# Patient Record
Sex: FEMALE | Race: WHITE | NOT HISPANIC OR LATINO | URBAN - METROPOLITAN AREA
[De-identification: names, ages, dates, MRNs, and addresses within clinical notes are randomized per-mention and may not be internally consistent; named-entity substitution may affect disease eponyms.]

---

## 2020-01-03 ENCOUNTER — EMERGENCY (EMERGENCY)
Facility: HOSPITAL | Age: 55
LOS: 1 days | Discharge: HOME | End: 2020-01-03
Attending: EMERGENCY MEDICINE | Admitting: EMERGENCY MEDICINE
Payer: COMMERCIAL

## 2020-01-03 VITALS
OXYGEN SATURATION: 98 % | TEMPERATURE: 98 F | HEART RATE: 70 BPM | SYSTOLIC BLOOD PRESSURE: 109 MMHG | RESPIRATION RATE: 16 BRPM | DIASTOLIC BLOOD PRESSURE: 73 MMHG

## 2020-01-03 VITALS
HEART RATE: 98 BPM | TEMPERATURE: 98 F | DIASTOLIC BLOOD PRESSURE: 81 MMHG | SYSTOLIC BLOOD PRESSURE: 116 MMHG | HEIGHT: 60 IN | OXYGEN SATURATION: 100 % | RESPIRATION RATE: 16 BRPM | WEIGHT: 121.92 LBS

## 2020-01-03 DIAGNOSIS — N60.02 SOLITARY CYST OF LEFT BREAST: Chronic | ICD-10-CM

## 2020-01-03 LAB
ALBUMIN SERPL ELPH-MCNC: 4 G/DL — SIGNIFICANT CHANGE UP (ref 3.3–5)
ALP SERPL-CCNC: 75 U/L — SIGNIFICANT CHANGE UP (ref 40–120)
ALT FLD-CCNC: 41 U/L — SIGNIFICANT CHANGE UP (ref 12–78)
ANION GAP SERPL CALC-SCNC: 9 MMOL/L — SIGNIFICANT CHANGE UP (ref 5–17)
APPEARANCE UR: ABNORMAL
APTT BLD: 36.7 SEC — SIGNIFICANT CHANGE UP (ref 28.5–37)
AST SERPL-CCNC: 29 U/L — SIGNIFICANT CHANGE UP (ref 15–37)
BACTERIA # UR AUTO: ABNORMAL
BASOPHILS # BLD AUTO: 0.04 K/UL — SIGNIFICANT CHANGE UP (ref 0–0.2)
BASOPHILS NFR BLD AUTO: 0.6 % — SIGNIFICANT CHANGE UP (ref 0–2)
BILIRUB SERPL-MCNC: 1.1 MG/DL — SIGNIFICANT CHANGE UP (ref 0.2–1.2)
BILIRUB UR-MCNC: ABNORMAL
BUN SERPL-MCNC: 11 MG/DL — SIGNIFICANT CHANGE UP (ref 7–23)
CALCIUM SERPL-MCNC: 9.5 MG/DL — SIGNIFICANT CHANGE UP (ref 8.5–10.1)
CHLORIDE SERPL-SCNC: 111 MMOL/L — HIGH (ref 96–108)
CO2 SERPL-SCNC: 24 MMOL/L — SIGNIFICANT CHANGE UP (ref 22–31)
COLOR SPEC: YELLOW — SIGNIFICANT CHANGE UP
CREAT SERPL-MCNC: 0.67 MG/DL — SIGNIFICANT CHANGE UP (ref 0.5–1.3)
DIFF PNL FLD: NEGATIVE — SIGNIFICANT CHANGE UP
EOSINOPHIL # BLD AUTO: 0.05 K/UL — SIGNIFICANT CHANGE UP (ref 0–0.5)
EOSINOPHIL NFR BLD AUTO: 0.7 % — SIGNIFICANT CHANGE UP (ref 0–6)
EPI CELLS # UR: SIGNIFICANT CHANGE UP
GLUCOSE SERPL-MCNC: 93 MG/DL — SIGNIFICANT CHANGE UP (ref 70–99)
GLUCOSE UR QL: NEGATIVE — SIGNIFICANT CHANGE UP
HCT VFR BLD CALC: 34.9 % — SIGNIFICANT CHANGE UP (ref 34.5–45)
HGB BLD-MCNC: 11.9 G/DL — SIGNIFICANT CHANGE UP (ref 11.5–15.5)
IMM GRANULOCYTES NFR BLD AUTO: 0.3 % — SIGNIFICANT CHANGE UP (ref 0–1.5)
INR BLD: 1.04 RATIO — SIGNIFICANT CHANGE UP (ref 0.88–1.16)
KETONES UR-MCNC: NEGATIVE — SIGNIFICANT CHANGE UP
LACTATE SERPL-SCNC: 1.2 MMOL/L — SIGNIFICANT CHANGE UP (ref 0.7–2)
LEUKOCYTE ESTERASE UR-ACNC: ABNORMAL
LYMPHOCYTES # BLD AUTO: 1.42 K/UL — SIGNIFICANT CHANGE UP (ref 1–3.3)
LYMPHOCYTES # BLD AUTO: 19.5 % — SIGNIFICANT CHANGE UP (ref 13–44)
MCHC RBC-ENTMCNC: 29.5 PG — SIGNIFICANT CHANGE UP (ref 27–34)
MCHC RBC-ENTMCNC: 34.1 GM/DL — SIGNIFICANT CHANGE UP (ref 32–36)
MCV RBC AUTO: 86.4 FL — SIGNIFICANT CHANGE UP (ref 80–100)
MONOCYTES # BLD AUTO: 0.57 K/UL — SIGNIFICANT CHANGE UP (ref 0–0.9)
MONOCYTES NFR BLD AUTO: 7.8 % — SIGNIFICANT CHANGE UP (ref 2–14)
NEUTROPHILS # BLD AUTO: 5.17 K/UL — SIGNIFICANT CHANGE UP (ref 1.8–7.4)
NEUTROPHILS NFR BLD AUTO: 71.1 % — SIGNIFICANT CHANGE UP (ref 43–77)
NITRITE UR-MCNC: POSITIVE
NRBC # BLD: 0 /100 WBCS — SIGNIFICANT CHANGE UP (ref 0–0)
PH UR: 7 — SIGNIFICANT CHANGE UP (ref 5–8)
PLATELET # BLD AUTO: 289 K/UL — SIGNIFICANT CHANGE UP (ref 150–400)
POTASSIUM SERPL-MCNC: 3.8 MMOL/L — SIGNIFICANT CHANGE UP (ref 3.5–5.3)
POTASSIUM SERPL-SCNC: 3.8 MMOL/L — SIGNIFICANT CHANGE UP (ref 3.5–5.3)
PROT SERPL-MCNC: 7.6 G/DL — SIGNIFICANT CHANGE UP (ref 6–8.3)
PROT UR-MCNC: NEGATIVE — SIGNIFICANT CHANGE UP
PROTHROM AB SERPL-ACNC: 11.8 SEC — SIGNIFICANT CHANGE UP (ref 10–12.9)
RBC # BLD: 4.04 M/UL — SIGNIFICANT CHANGE UP (ref 3.8–5.2)
RBC # FLD: 12.4 % — SIGNIFICANT CHANGE UP (ref 10.3–14.5)
RBC CASTS # UR COMP ASSIST: NEGATIVE /HPF — SIGNIFICANT CHANGE UP (ref 0–4)
SODIUM SERPL-SCNC: 144 MMOL/L — SIGNIFICANT CHANGE UP (ref 135–145)
SP GR SPEC: 1 — LOW (ref 1.01–1.02)
UROBILINOGEN FLD QL: NEGATIVE — SIGNIFICANT CHANGE UP
WBC # BLD: 7.27 K/UL — SIGNIFICANT CHANGE UP (ref 3.8–10.5)
WBC # FLD AUTO: 7.27 K/UL — SIGNIFICANT CHANGE UP (ref 3.8–10.5)
WBC UR QL: SIGNIFICANT CHANGE UP

## 2020-01-03 PROCEDURE — 93005 ELECTROCARDIOGRAM TRACING: CPT

## 2020-01-03 PROCEDURE — 81001 URINALYSIS AUTO W/SCOPE: CPT

## 2020-01-03 PROCEDURE — 99284 EMERGENCY DEPT VISIT MOD MDM: CPT

## 2020-01-03 PROCEDURE — 83605 ASSAY OF LACTIC ACID: CPT

## 2020-01-03 PROCEDURE — 36415 COLL VENOUS BLD VENIPUNCTURE: CPT

## 2020-01-03 PROCEDURE — 87086 URINE CULTURE/COLONY COUNT: CPT

## 2020-01-03 PROCEDURE — 87040 BLOOD CULTURE FOR BACTERIA: CPT

## 2020-01-03 PROCEDURE — 85610 PROTHROMBIN TIME: CPT

## 2020-01-03 PROCEDURE — 71046 X-RAY EXAM CHEST 2 VIEWS: CPT | Mod: 26

## 2020-01-03 PROCEDURE — 80053 COMPREHEN METABOLIC PANEL: CPT

## 2020-01-03 PROCEDURE — 99283 EMERGENCY DEPT VISIT LOW MDM: CPT | Mod: 25

## 2020-01-03 PROCEDURE — 85730 THROMBOPLASTIN TIME PARTIAL: CPT

## 2020-01-03 PROCEDURE — 85027 COMPLETE CBC AUTOMATED: CPT

## 2020-01-03 PROCEDURE — 71046 X-RAY EXAM CHEST 2 VIEWS: CPT

## 2020-01-03 PROCEDURE — 96360 HYDRATION IV INFUSION INIT: CPT

## 2020-01-03 PROCEDURE — 93010 ELECTROCARDIOGRAM REPORT: CPT

## 2020-01-03 RX ORDER — CEFUROXIME AXETIL 250 MG
1 TABLET ORAL
Qty: 14 | Refills: 0
Start: 2020-01-03 | End: 2020-01-09

## 2020-01-03 RX ORDER — SODIUM CHLORIDE 9 MG/ML
1700 INJECTION INTRAMUSCULAR; INTRAVENOUS; SUBCUTANEOUS ONCE
Refills: 0 | Status: COMPLETED | OUTPATIENT
Start: 2020-01-03 | End: 2020-01-03

## 2020-01-03 RX ORDER — CEFUROXIME AXETIL 250 MG
500 TABLET ORAL ONCE
Refills: 0 | Status: COMPLETED | OUTPATIENT
Start: 2020-01-03 | End: 2020-01-03

## 2020-01-03 RX ADMIN — SODIUM CHLORIDE 1700 MILLILITER(S): 9 INJECTION INTRAMUSCULAR; INTRAVENOUS; SUBCUTANEOUS at 11:30

## 2020-01-03 RX ADMIN — SODIUM CHLORIDE 1700 MILLILITER(S): 9 INJECTION INTRAMUSCULAR; INTRAVENOUS; SUBCUTANEOUS at 10:11

## 2020-01-03 RX ADMIN — Medication 500 MILLIGRAM(S): at 10:30

## 2020-01-03 NOTE — ED PROVIDER NOTE - PATIENT PORTAL LINK FT
You can access the FollowMyHealth Patient Portal offered by [facility] by registering at the following website: [email or web address]. By joining [employer]’s FollowMyHealth portal, you will also be able to view your health information using other applications (apps) compatible with our system.

## 2020-01-03 NOTE — ED ADULT NURSE NOTE - OBJECTIVE STATEMENT
Received pt in bed alert and oriented.  C/O urinary symptoms since yesterday.  Pt went to  and was given ampicillin.  Pt stated she still does not feel well.  Ongoing nursing care and safety maintained.

## 2020-01-03 NOTE — ED PROVIDER NOTE - NSFOLLOWUPINSTRUCTIONS_ED_ALL_ED_FT
Urinary Tract Infection    A urinary tract infection (UTI) is an infection of any part of the urinary tract, which includes the kidneys, ureters, bladder, and urethra. Risk factors include ignoring your need to urinate, wiping back to front if female, being an uncircumcised male, and having diabetes or a weak immune system. Symptoms include frequent urination, pain or burning with urination, foul smelling urine, cloudy urine, pain in the lower abdomen, blood in the urine, and fever. If you were prescribed an antibiotic medicine, take it as told by your health care provider. Do not stop taking the antibiotic even if you start to feel better.    SEEK IMMEDIATE MEDICAL CARE IF YOU HAVE ANY OF THE FOLLOWING SYMPTOMS: severe back or abdominal pain, fever, inability to keep fluids or medicine down, dizziness/lightheadedness, or a change in mental status.    STOP AMOXICILLIN START CEFTIN  STAY WELL HYDRATED   FOLLOW UP WITH DR GAYLE

## 2020-01-03 NOTE — ED ADULT NURSE NOTE - NSIMPLEMENTINTERV_GEN_ALL_ED
Implemented All Universal Safety Interventions:  [place] to call system. Call bell, personal items and telephone within reach. Instruct patient to call for assistance. Room bathroom lighting operational. Non-slip footwear when patient is off stretcher. Physically safe environment: no spills, clutter or unnecessary equipment. Stretcher in lowest position, wheels locked, appropriate side rails in place.

## 2020-01-03 NOTE — ED ADULT NURSE NOTE - CARDIO WDL
Normal rate, regular rhythm, normal S1, S2 heart sounds heard.
0 = understands/communicates without difficulty

## 2020-01-03 NOTE — ED PROVIDER NOTE - ENMT, MLM
Airway patent, Nasal mucosa clear. Mouth with normal mucosa. Throat has no vesicles, no oropharyngeal exudates and uvula is midline. tm's clear

## 2020-01-03 NOTE — ED PROVIDER NOTE - CARE PROVIDER_API CALL
Anthony Santos)  Cardiovascular Disease; Internal Medicine  [address] [place] [address]  Phone: [phone]  Fax: [phone]  Follow Up Time:

## 2020-01-03 NOTE — ED PROVIDER NOTE - OBJECTIVE STATEMENT
pt is a 55 yo f who has hx of hypothyroid, and breast cysts surgery pmd dr forrester was seen in urgent care yest for urine dysuria started on ampicillin and pyridium for uti bu marvel still feels unwell with cough body aches and palpitations.  no fever no chills no n v d no travel no ill contacts  worried for more serous infection she came to er with  for eval.

## 2020-01-04 LAB
CULTURE RESULTS: NO GROWTH — SIGNIFICANT CHANGE UP
SPECIMEN SOURCE: SIGNIFICANT CHANGE UP

## 2020-01-08 LAB
CULTURE RESULTS: SIGNIFICANT CHANGE UP
CULTURE RESULTS: SIGNIFICANT CHANGE UP
SPECIMEN SOURCE: SIGNIFICANT CHANGE UP
SPECIMEN SOURCE: SIGNIFICANT CHANGE UP

## 2020-01-26 ENCOUNTER — EMERGENCY (EMERGENCY)
Facility: HOSPITAL | Age: 55
LOS: 1 days | Discharge: HOME | End: 2020-01-26
Attending: EMERGENCY MEDICINE | Admitting: EMERGENCY MEDICINE
Payer: COMMERCIAL

## 2020-01-26 VITALS
TEMPERATURE: 98 F | HEART RATE: 88 BPM | DIASTOLIC BLOOD PRESSURE: 77 MMHG | OXYGEN SATURATION: 97 % | SYSTOLIC BLOOD PRESSURE: 123 MMHG | RESPIRATION RATE: 18 BRPM

## 2020-01-26 VITALS
WEIGHT: 115.08 LBS | HEIGHT: 60 IN | DIASTOLIC BLOOD PRESSURE: 82 MMHG | TEMPERATURE: 98 F | OXYGEN SATURATION: 96 % | HEART RATE: 92 BPM | RESPIRATION RATE: 18 BRPM | SYSTOLIC BLOOD PRESSURE: 121 MMHG

## 2020-01-26 DIAGNOSIS — N60.02 SOLITARY CYST OF LEFT BREAST: Chronic | ICD-10-CM

## 2020-01-26 PROBLEM — E03.9 HYPOTHYROIDISM, UNSPECIFIED: Chronic | Status: ACTIVE | Noted: 2020-01-03

## 2020-01-26 LAB
ALBUMIN SERPL ELPH-MCNC: 4.1 G/DL — SIGNIFICANT CHANGE UP (ref 3.3–5)
ALP SERPL-CCNC: 73 U/L — SIGNIFICANT CHANGE UP (ref 40–120)
ALT FLD-CCNC: 44 U/L — SIGNIFICANT CHANGE UP (ref 12–78)
ANION GAP SERPL CALC-SCNC: 9 MMOL/L — SIGNIFICANT CHANGE UP (ref 5–17)
AST SERPL-CCNC: 30 U/L — SIGNIFICANT CHANGE UP (ref 15–37)
BASOPHILS # BLD AUTO: 0.05 K/UL — SIGNIFICANT CHANGE UP (ref 0–0.2)
BASOPHILS NFR BLD AUTO: 0.6 % — SIGNIFICANT CHANGE UP (ref 0–2)
BILIRUB SERPL-MCNC: 0.5 MG/DL — SIGNIFICANT CHANGE UP (ref 0.2–1.2)
BUN SERPL-MCNC: 13 MG/DL — SIGNIFICANT CHANGE UP (ref 7–23)
CALCIUM SERPL-MCNC: 9.6 MG/DL — SIGNIFICANT CHANGE UP (ref 8.5–10.1)
CHLORIDE SERPL-SCNC: 109 MMOL/L — HIGH (ref 96–108)
CO2 SERPL-SCNC: 22 MMOL/L — SIGNIFICANT CHANGE UP (ref 22–31)
CREAT SERPL-MCNC: 0.55 MG/DL — SIGNIFICANT CHANGE UP (ref 0.5–1.3)
EOSINOPHIL # BLD AUTO: 0.03 K/UL — SIGNIFICANT CHANGE UP (ref 0–0.5)
EOSINOPHIL NFR BLD AUTO: 0.4 % — SIGNIFICANT CHANGE UP (ref 0–6)
GLUCOSE SERPL-MCNC: 96 MG/DL — SIGNIFICANT CHANGE UP (ref 70–99)
HCT VFR BLD CALC: 37.2 % — SIGNIFICANT CHANGE UP (ref 34.5–45)
HGB BLD-MCNC: 12.8 G/DL — SIGNIFICANT CHANGE UP (ref 11.5–15.5)
IMM GRANULOCYTES NFR BLD AUTO: 0.3 % — SIGNIFICANT CHANGE UP (ref 0–1.5)
LYMPHOCYTES # BLD AUTO: 2.39 K/UL — SIGNIFICANT CHANGE UP (ref 1–3.3)
LYMPHOCYTES # BLD AUTO: 30.4 % — SIGNIFICANT CHANGE UP (ref 13–44)
MCHC RBC-ENTMCNC: 29.8 PG — SIGNIFICANT CHANGE UP (ref 27–34)
MCHC RBC-ENTMCNC: 34.4 GM/DL — SIGNIFICANT CHANGE UP (ref 32–36)
MCV RBC AUTO: 86.5 FL — SIGNIFICANT CHANGE UP (ref 80–100)
MONOCYTES # BLD AUTO: 0.54 K/UL — SIGNIFICANT CHANGE UP (ref 0–0.9)
MONOCYTES NFR BLD AUTO: 6.9 % — SIGNIFICANT CHANGE UP (ref 2–14)
NEUTROPHILS # BLD AUTO: 4.83 K/UL — SIGNIFICANT CHANGE UP (ref 1.8–7.4)
NEUTROPHILS NFR BLD AUTO: 61.4 % — SIGNIFICANT CHANGE UP (ref 43–77)
NRBC # BLD: 0 /100 WBCS — SIGNIFICANT CHANGE UP (ref 0–0)
PLATELET # BLD AUTO: 277 K/UL — SIGNIFICANT CHANGE UP (ref 150–400)
POTASSIUM SERPL-MCNC: 4 MMOL/L — SIGNIFICANT CHANGE UP (ref 3.5–5.3)
POTASSIUM SERPL-SCNC: 4 MMOL/L — SIGNIFICANT CHANGE UP (ref 3.5–5.3)
PROT SERPL-MCNC: 7.8 G/DL — SIGNIFICANT CHANGE UP (ref 6–8.3)
RBC # BLD: 4.3 M/UL — SIGNIFICANT CHANGE UP (ref 3.8–5.2)
RBC # FLD: 12.7 % — SIGNIFICANT CHANGE UP (ref 10.3–14.5)
SODIUM SERPL-SCNC: 140 MMOL/L — SIGNIFICANT CHANGE UP (ref 135–145)
WBC # BLD: 7.86 K/UL — SIGNIFICANT CHANGE UP (ref 3.8–10.5)
WBC # FLD AUTO: 7.86 K/UL — SIGNIFICANT CHANGE UP (ref 3.8–10.5)

## 2020-01-26 PROCEDURE — 99283 EMERGENCY DEPT VISIT LOW MDM: CPT

## 2020-01-26 PROCEDURE — 36415 COLL VENOUS BLD VENIPUNCTURE: CPT

## 2020-01-26 PROCEDURE — 80053 COMPREHEN METABOLIC PANEL: CPT

## 2020-01-26 PROCEDURE — 85027 COMPLETE CBC AUTOMATED: CPT

## 2020-01-26 RX ORDER — SODIUM CHLORIDE 9 MG/ML
1000 INJECTION INTRAMUSCULAR; INTRAVENOUS; SUBCUTANEOUS ONCE
Refills: 0 | Status: COMPLETED | OUTPATIENT
Start: 2020-01-26 | End: 2020-01-26

## 2020-01-26 RX ADMIN — SODIUM CHLORIDE 1000 MILLILITER(S): 9 INJECTION INTRAMUSCULAR; INTRAVENOUS; SUBCUTANEOUS at 12:25

## 2020-01-26 NOTE — ED PROVIDER NOTE - CLINICAL SUMMARY MEDICAL DECISION MAKING FREE TEXT BOX
ro metabolic ro anemia ro dehydration by electrolyte iv fluids bolus re eval and refer to pmd pt refuses to provide ua as   "the cultures are normal"

## 2020-01-26 NOTE — ED PROVIDER NOTE - MUSCULOSKELETAL, MLM
Spine appears normal, range of motion is not limited, no muscle or joint tenderness no thenar muscle wasting but there is mild biltemporal wasting. no pain to palp

## 2020-01-26 NOTE — ED ADULT NURSE NOTE - OBJECTIVE STATEMENT
received pt in bed #13b states was dx w/ UTI 1/3 & has lost 7lbs since. c/o decreased appetite pt states she is not eating but drinking "but my throat is so dry I am just dehydrated"

## 2020-01-26 NOTE — ED ADULT NURSE NOTE - CHPI ED NUR SYMPTOMS NEG
no decreased eating/drinking/no dizziness/no tingling/no vomiting/no weakness/no chills/no nausea/no pain/no fever

## 2020-01-26 NOTE — ED PROVIDER NOTE - CARE PROVIDER_API CALL
Anthony Santos)  Cardiovascular Disease; Internal Medicine  [address] [place] [address]  Phone: [phone]  Fax: [phone]  Follow Up Time:

## 2020-01-26 NOTE — ED PROVIDER NOTE - OBJECTIVE STATEMENT
pt is a 55 yo f whose pmd is dr Santos was treated for a uti remotely by me this month.  she denies any other sig pmhx sp breast cys allergic to levaquin and sulfa saw her gyn dr Lockett for follow up sp uti and was medically cleared urine culture neg.  never smoker not a drinker no drugs  she presents today feeling "dehydrated" no cough no fever no chills no cp no sob no other complaints.  she is also endorsing that since her last visit she may have lost some weight.  not sure why she feels so dehydrated she came to er with sig other for evaluation.

## 2020-01-26 NOTE — ED PROVIDER NOTE - ENMT, MLM
Airway patent, Nasal mucosa clear. Mouth with dry lips otherwise normal mucosa. Throat has no vesicles, no oropharyngeal exudates and uvula is midline.

## 2020-01-26 NOTE — ED PROVIDER NOTE - PATIENT PORTAL LINK FT
You can access the FollowMyHealth Patient Portal offered by [facility] by registering at the following website: [email or web address]. By joining [employer]’s FollowMyHealth portal, you will also be able to view your health information using other applications (apps) compatible with our system.

## 2020-01-26 NOTE — ED ADULT TRIAGE NOTE - CHIEF COMPLAINT QUOTE
Pt. states generalized weakness w/ decreased eating and drinking since diagnosis of uti on 1/3/2020, now states cough for chills for last three days.

## 2020-01-26 NOTE — ED PROVIDER NOTE - NSFOLLOWUPINSTRUCTIONS_ED_ALL_ED_FT
rest  stay well hydrated  do not skip meals  follow up with dr Santos and your gynecologist  return for fever chills or any concerns.     WHAT YOU NEED TO KNOW:    Fatigue is mental and physical exhaustion that does not get better with rest. Fatigue may make daily activities difficult or cause extreme sleepiness. It is normal to feel tired sometimes, but long-term fatigue may be a sign of serious illness.    DISCHARGE INSTRUCTIONS:    Return to the emergency department if:     You have chest pain.       You have difficulty breathing.     Contact your healthcare provider if:     You have a cough that gets worse, or does not go away.       You see blood in your urine or bowel movement.       You have numbness or tingling around your mouth or in an arm or leg.       You faint, feel dizzy, or have vision changes.       You have swelling in your lymph nodes.       You are a woman and have vaginal bleeding that is not normal for you, or is not expected.       You lose weight without trying, or you have trouble eating.       You feel weak or have muscle pain.       You have pain or swelling in your joints.      You have questions or concerns about your condition or care.     Follow up with your healthcare provider as directed: You may need more tests. Your healthcare provider may also refer you to a specialist. Write down your questions so you remember to ask them during your visits.    Manage fatigue:     Keep a fatigue diary. Include anything that makes you feel more tired or less tired. Bring the diary with you to follow-up visits with your provider.      Exercise as directed. Exercise can help you feel more alert. Exercise can also help you manage stress or relieve depression. Try to get at least 30 minutes of exercise most days of the week.      Keep a regular sleep schedule. Go to bed and wake up at the same times every day. Limit naps to 1 hour each day. A nap can improve fatigue, but a long nap may make it harder to go to sleep at night.      Plan and limit your activities. Limit the number of activities such as shopping and cleaning you do each day. If possible, try to spread out your trips throughout the week. Plan ahead so you are not rushing to get something done. Only do activities that you have the energy to complete. Take breaks between activities. Ask for help if you need it. Another person may be able to drive you or help with daily activities.      Eat a variety of healthy foods. Healthy foods include fruits, vegetables, whole-grain breads, low-fat dairy products, beans, lean meats, and fish. Good nutrition can help manage fatigue.      Limit caffeine and alcohol. These can make it difficult to fall or stay asleep. Women should limit alcohol to 1 drink a day. Men should limit alcohol to 2 drinks a day. A drink of alcohol is 12 ounces of beer, 5 ounces of wine, or 1½ ounces of liquor. Ask our healthcare provider how much caffeine is safe for you.      Do not smoke. Nicotine and other chemicals in cigarettes and cigars can cause lung damage and increase fatigue. Ask your healthcare provider for information if you currently smoke and need help to quit. E-cigarettes or smokeless tobacco still contain nicotine. Talk to your healthcare provider before you use these products.

## 2020-01-26 NOTE — ED PROVIDER NOTE - CHPI ED SYMPTOMS NEG
no chills/no decreased eating/drinking/no nausea/no tingling/no weakness/no dizziness/no fever/no vomiting/no numbness/no pain

## 2020-05-22 ENCOUNTER — OUTPATIENT (OUTPATIENT)
Dept: OUTPATIENT SERVICES | Facility: HOSPITAL | Age: 55
LOS: 1 days | End: 2020-05-22
Payer: COMMERCIAL

## 2020-05-22 DIAGNOSIS — N60.02 SOLITARY CYST OF LEFT BREAST: Chronic | ICD-10-CM

## 2020-05-22 DIAGNOSIS — D50.0 IRON DEFICIENCY ANEMIA SECONDARY TO BLOOD LOSS (CHRONIC): ICD-10-CM

## 2020-05-22 PROCEDURE — 36415 COLL VENOUS BLD VENIPUNCTURE: CPT

## 2020-05-22 PROCEDURE — 80048 BASIC METABOLIC PNL TOTAL CA: CPT

## 2020-05-22 PROCEDURE — 85027 COMPLETE CBC AUTOMATED: CPT

## 2020-05-26 ENCOUNTER — OUTPATIENT (OUTPATIENT)
Dept: OUTPATIENT SERVICES | Facility: HOSPITAL | Age: 55
LOS: 1 days | End: 2020-05-26

## 2020-05-26 VITALS — HEIGHT: 60 IN | WEIGHT: 106.04 LBS

## 2020-05-26 DIAGNOSIS — N60.02 SOLITARY CYST OF LEFT BREAST: Chronic | ICD-10-CM

## 2020-05-26 DIAGNOSIS — Z98.890 OTHER SPECIFIED POSTPROCEDURAL STATES: Chronic | ICD-10-CM

## 2020-05-26 DIAGNOSIS — N95.0 POSTMENOPAUSAL BLEEDING: ICD-10-CM

## 2020-05-26 DIAGNOSIS — Z01.818 ENCOUNTER FOR OTHER PREPROCEDURAL EXAMINATION: ICD-10-CM

## 2020-05-26 DIAGNOSIS — J34.2 DEVIATED NASAL SEPTUM: Chronic | ICD-10-CM

## 2020-05-26 NOTE — H&P PST ADULT - NSICDXPASTMEDICALHX_GEN_ALL_CORE_FT
PAST MEDICAL HISTORY:  Anemia     Hypercholesteremia     Hypothyroid     Post-menopausal bleeding PAST MEDICAL HISTORY:  Anemia     Anxiety     History of TMJ disorder     Hypercholesteremia     Hypothyroid     Post-menopausal bleeding     UTI (urinary tract infection), bacterial treated

## 2020-05-26 NOTE — H&P PST ADULT - NSICDXPASTSURGICALHX_GEN_ALL_CORE_FT
PAST SURGICAL HISTORY:  Breast cyst, left sp surgery PAST SURGICAL HISTORY:  Breast cyst, left sp surgery    Deviated septum 1980's    H/O cervical spine surgery 1995    Status post breast reduction 1980's PAST SURGICAL HISTORY:  Breast cyst, left sp surgery    Deviated septum 1980's    Status post breast reduction 1980's

## 2020-05-26 NOTE — H&P PST ADULT - NSANTHOSAYNRD_GEN_A_CORE
No. JUNE screening performed.  STOP BANG Legend: 0-2 = LOW Risk; 3-4 = INTERMEDIATE Risk; 5-8 = HIGH Risk

## 2020-05-26 NOTE — H&P PST ADULT - NSICDXPROBLEM_GEN_ALL_CORE_FT
PROBLEM DIAGNOSES  Problem: Post-menopausal bleeding  Assessment and Plan: check labs cbc cmp type and screen   ekg 1/3/20  medical clearnce  patient scheduled for covid test 5/27/20  preop instructions given

## 2020-05-26 NOTE — H&P PST ADULT - NSANTHNECKRD_ENT_A_CORE
Completed PA approved through 10/30/19-12/31/20  Will be faxing approval   Pharmacy and pt  Is aware 
Pt went to the pharmacy and was told they can't give her the Januvia because of insurance purposes, can we look into this and see if she needs a prior auth?    thanks
No

## 2020-05-26 NOTE — H&P PST ADULT - HISTORY OF PRESENT ILLNESS
54 yo female scheduled for Dilation and Curettage Hysteroscopy on 20 with Dr Rocha.  Two weeks ago patient had vaginal bleeding for 5 days.  LMP   . 54 yo female scheduled for Dilation and Curettage Hysteroscopy on 20 with Dr Rocha.  Two weeks ago patient had cramping and vaginal bleeding for 5 days.  LMP   . 56 yo female scheduled for Dilation and Curettage Hysteroscopy on 20 with Dr Rocha.  Two weeks ago patient had cramping and vaginal bleeding for 5 days.  LMP   .   20: Addendum: Seen pt to verify history and physical:

## 2020-05-27 ENCOUNTER — OUTPATIENT (OUTPATIENT)
Dept: OUTPATIENT SERVICES | Facility: HOSPITAL | Age: 55
LOS: 1 days | End: 2020-05-27
Payer: COMMERCIAL

## 2020-05-27 DIAGNOSIS — Z98.890 OTHER SPECIFIED POSTPROCEDURAL STATES: Chronic | ICD-10-CM

## 2020-05-27 DIAGNOSIS — N60.02 SOLITARY CYST OF LEFT BREAST: Chronic | ICD-10-CM

## 2020-05-27 DIAGNOSIS — Z11.59 ENCOUNTER FOR SCREENING FOR OTHER VIRAL DISEASES: ICD-10-CM

## 2020-05-27 DIAGNOSIS — J34.2 DEVIATED NASAL SEPTUM: Chronic | ICD-10-CM

## 2020-05-27 LAB — SARS-COV-2 RNA SPEC QL NAA+PROBE: SIGNIFICANT CHANGE UP

## 2020-05-27 PROCEDURE — 87635 SARS-COV-2 COVID-19 AMP PRB: CPT

## 2020-05-27 NOTE — ASU PATIENT PROFILE, ADULT - PMH
Anemia    Anxiety    History of TMJ disorder    Hypercholesteremia    Hypothyroid    Post-menopausal bleeding    UTI (urinary tract infection), bacterial  treated

## 2020-05-27 NOTE — ASU PATIENT PROFILE, ADULT - PSH
Breast cyst, left  sp surgery  Deviated septum  1980's  H/O cervical spine surgery  1995  Status post breast reduction  1980's

## 2020-05-28 ENCOUNTER — OUTPATIENT (OUTPATIENT)
Dept: OUTPATIENT SERVICES | Facility: HOSPITAL | Age: 55
LOS: 1 days | End: 2020-05-28
Payer: COMMERCIAL

## 2020-05-28 VITALS
SYSTOLIC BLOOD PRESSURE: 130 MMHG | WEIGHT: 104.94 LBS | OXYGEN SATURATION: 98 % | HEART RATE: 87 BPM | RESPIRATION RATE: 16 BRPM | DIASTOLIC BLOOD PRESSURE: 83 MMHG | TEMPERATURE: 99 F

## 2020-05-28 VITALS
HEART RATE: 76 BPM | SYSTOLIC BLOOD PRESSURE: 112 MMHG | RESPIRATION RATE: 16 BRPM | OXYGEN SATURATION: 100 % | DIASTOLIC BLOOD PRESSURE: 68 MMHG

## 2020-05-28 DIAGNOSIS — J34.2 DEVIATED NASAL SEPTUM: Chronic | ICD-10-CM

## 2020-05-28 DIAGNOSIS — N60.02 SOLITARY CYST OF LEFT BREAST: Chronic | ICD-10-CM

## 2020-05-28 DIAGNOSIS — Z98.890 OTHER SPECIFIED POSTPROCEDURAL STATES: Chronic | ICD-10-CM

## 2020-05-28 DIAGNOSIS — N95.0 POSTMENOPAUSAL BLEEDING: ICD-10-CM

## 2020-05-28 PROCEDURE — 88305 TISSUE EXAM BY PATHOLOGIST: CPT

## 2020-05-28 PROCEDURE — 88342 IMHCHEM/IMCYTCHM 1ST ANTB: CPT | Mod: 26

## 2020-05-28 PROCEDURE — 88305 TISSUE EXAM BY PATHOLOGIST: CPT | Mod: 26

## 2020-05-28 PROCEDURE — 86901 BLOOD TYPING SEROLOGIC RH(D): CPT

## 2020-05-28 PROCEDURE — 88341 IMHCHEM/IMCYTCHM EA ADD ANTB: CPT | Mod: 26

## 2020-05-28 PROCEDURE — 86850 RBC ANTIBODY SCREEN: CPT

## 2020-05-28 PROCEDURE — 86900 BLOOD TYPING SEROLOGIC ABO: CPT

## 2020-05-28 PROCEDURE — 88342 IMHCHEM/IMCYTCHM 1ST ANTB: CPT

## 2020-05-28 PROCEDURE — 88341 IMHCHEM/IMCYTCHM EA ADD ANTB: CPT

## 2020-05-28 PROCEDURE — 36415 COLL VENOUS BLD VENIPUNCTURE: CPT

## 2020-05-28 PROCEDURE — 58558 HYSTEROSCOPY BIOPSY: CPT

## 2020-05-28 RX ORDER — ONDANSETRON 8 MG/1
4 TABLET, FILM COATED ORAL ONCE
Refills: 0 | Status: DISCONTINUED | OUTPATIENT
Start: 2020-05-28 | End: 2020-05-28

## 2020-05-28 RX ORDER — HYDROMORPHONE HYDROCHLORIDE 2 MG/ML
0.5 INJECTION INTRAMUSCULAR; INTRAVENOUS; SUBCUTANEOUS
Refills: 0 | Status: DISCONTINUED | OUTPATIENT
Start: 2020-05-28 | End: 2020-05-28

## 2020-05-28 RX ORDER — KETOROLAC TROMETHAMINE 30 MG/ML
30 SYRINGE (ML) INJECTION ONCE
Refills: 0 | Status: DISCONTINUED | OUTPATIENT
Start: 2020-05-28 | End: 2020-05-28

## 2020-05-28 RX ORDER — SODIUM CHLORIDE 9 MG/ML
1000 INJECTION, SOLUTION INTRAVENOUS
Refills: 0 | Status: DISCONTINUED | OUTPATIENT
Start: 2020-05-28 | End: 2020-05-28

## 2020-05-28 RX ADMIN — SODIUM CHLORIDE 75 MILLILITER(S): 9 INJECTION, SOLUTION INTRAVENOUS at 08:44

## 2020-05-28 NOTE — BRIEF OPERATIVE NOTE - NSICDXBRIEFPOSTOP_GEN_ALL_CORE_FT
POST-OP DIAGNOSIS:  Cervical os stenosis 28-May-2020 07:22:01  Merlin Rocha  Postmenopausal bleeding 28-May-2020 07:20:59  Merlin Rocha

## 2020-05-28 NOTE — ASU DISCHARGE PLAN (ADULT/PEDIATRIC) - CARE PROVIDER_API CALL
Merlin Rocha  OBSTETRICS AND GYNECOLOGY  [address] [place] [address]  Phone: [phone]  Fax: [phone]  Follow Up Time:

## 2020-05-28 NOTE — ASU DISCHARGE PLAN (ADULT/PEDIATRIC) - ACTIVITY LEVEL
No heavy lifting/No tampons/Nothing per rectum/Nothing per vagina/No tub baths/No sports/gym/No douching/No intercourse

## 2020-06-03 LAB — SURGICAL PATHOLOGY STUDY: SIGNIFICANT CHANGE UP

## 2020-06-24 ENCOUNTER — OUTPATIENT (OUTPATIENT)
Dept: OUTPATIENT SERVICES | Facility: HOSPITAL | Age: 55
LOS: 1 days | Discharge: STILL ADMITTED OR ADMINISTRATIVE | End: 2020-06-24
Payer: COMMERCIAL

## 2020-06-24 ENCOUNTER — INPATIENT (INPATIENT)
Facility: HOSPITAL | Age: 55
LOS: 29 days | Discharge: HOME | End: 2020-07-24
Attending: PSYCHIATRY & NEUROLOGY | Admitting: PSYCHIATRY & NEUROLOGY
Payer: COMMERCIAL

## 2020-06-24 VITALS — RESPIRATION RATE: 16 BRPM | OXYGEN SATURATION: 99 % | WEIGHT: 105.16 LBS | TEMPERATURE: 97 F

## 2020-06-24 DIAGNOSIS — N60.02 SOLITARY CYST OF LEFT BREAST: Chronic | ICD-10-CM

## 2020-06-24 DIAGNOSIS — Z98.890 OTHER SPECIFIED POSTPROCEDURAL STATES: Chronic | ICD-10-CM

## 2020-06-24 DIAGNOSIS — F33.2 MAJOR DEPRESSIVE DISORDER, RECURRENT SEVERE WITHOUT PSYCHOTIC FEATURES: ICD-10-CM

## 2020-06-24 DIAGNOSIS — J34.2 DEVIATED NASAL SEPTUM: Chronic | ICD-10-CM

## 2020-06-24 PROBLEM — F41.9 ANXIETY DISORDER, UNSPECIFIED: Chronic | Status: ACTIVE | Noted: 2020-05-26

## 2020-06-24 PROBLEM — D64.9 ANEMIA, UNSPECIFIED: Chronic | Status: ACTIVE | Noted: 2020-05-26

## 2020-06-24 PROBLEM — Z87.39 PERSONAL HISTORY OF OTHER DISEASES OF THE MUSCULOSKELETAL SYSTEM AND CONNECTIVE TISSUE: Chronic | Status: ACTIVE | Noted: 2020-05-26

## 2020-06-24 PROBLEM — N39.0 URINARY TRACT INFECTION, SITE NOT SPECIFIED: Chronic | Status: ACTIVE | Noted: 2020-05-26

## 2020-06-24 PROBLEM — E78.00 PURE HYPERCHOLESTEROLEMIA, UNSPECIFIED: Chronic | Status: ACTIVE | Noted: 2020-05-26

## 2020-06-24 PROBLEM — N95.0 POSTMENOPAUSAL BLEEDING: Chronic | Status: ACTIVE | Noted: 2020-05-26

## 2020-06-24 LAB
ALBUMIN SERPL ELPH-MCNC: 4.2 G/DL — SIGNIFICANT CHANGE UP (ref 3.3–5)
ALP SERPL-CCNC: 94 U/L — SIGNIFICANT CHANGE UP (ref 40–120)
ALT FLD-CCNC: 26 U/L — SIGNIFICANT CHANGE UP (ref 4–33)
AMPHET UR-MCNC: NEGATIVE — SIGNIFICANT CHANGE UP
ANION GAP SERPL CALC-SCNC: 14 MMO/L — SIGNIFICANT CHANGE UP (ref 7–14)
APPEARANCE UR: CLEAR — SIGNIFICANT CHANGE UP
AST SERPL-CCNC: 20 U/L — SIGNIFICANT CHANGE UP (ref 4–32)
BACTERIA # UR AUTO: SIGNIFICANT CHANGE UP
BARBITURATES UR SCN-MCNC: NEGATIVE — SIGNIFICANT CHANGE UP
BASOPHILS # BLD AUTO: 0.05 K/UL — SIGNIFICANT CHANGE UP (ref 0–0.2)
BASOPHILS NFR BLD AUTO: 0.4 % — SIGNIFICANT CHANGE UP (ref 0–2)
BENZODIAZ UR-MCNC: POSITIVE — SIGNIFICANT CHANGE UP
BILIRUB SERPL-MCNC: 0.2 MG/DL — SIGNIFICANT CHANGE UP (ref 0.2–1.2)
BILIRUB UR-MCNC: NEGATIVE — SIGNIFICANT CHANGE UP
BLOOD UR QL VISUAL: NEGATIVE — SIGNIFICANT CHANGE UP
BUN SERPL-MCNC: 19 MG/DL — SIGNIFICANT CHANGE UP (ref 7–23)
CALCIUM SERPL-MCNC: 10 MG/DL — SIGNIFICANT CHANGE UP (ref 8.4–10.5)
CANNABINOIDS UR-MCNC: NEGATIVE — SIGNIFICANT CHANGE UP
CHLORIDE SERPL-SCNC: 103 MMOL/L — SIGNIFICANT CHANGE UP (ref 98–107)
CHOLEST SERPL-MCNC: 128 MG/DL — SIGNIFICANT CHANGE UP (ref 120–199)
CO2 SERPL-SCNC: 23 MMOL/L — SIGNIFICANT CHANGE UP (ref 22–31)
COCAINE METAB.OTHER UR-MCNC: NEGATIVE — SIGNIFICANT CHANGE UP
COD CRY URNS QL: SIGNIFICANT CHANGE UP (ref 0–0)
COLOR SPEC: YELLOW — SIGNIFICANT CHANGE UP
CREAT SERPL-MCNC: 0.58 MG/DL — SIGNIFICANT CHANGE UP (ref 0.5–1.3)
EOSINOPHIL # BLD AUTO: 0.1 K/UL — SIGNIFICANT CHANGE UP (ref 0–0.5)
EOSINOPHIL NFR BLD AUTO: 0.8 % — SIGNIFICANT CHANGE UP (ref 0–6)
FOLATE SERPL-MCNC: 11.7 NG/ML — SIGNIFICANT CHANGE UP (ref 4.7–20)
GLUCOSE SERPL-MCNC: 147 MG/DL — HIGH (ref 70–99)
GLUCOSE UR-MCNC: NEGATIVE — SIGNIFICANT CHANGE UP
HBA1C BLD-MCNC: 4.9 % — SIGNIFICANT CHANGE UP (ref 4–5.6)
HCG UR-SCNC: NEGATIVE — SIGNIFICANT CHANGE UP
HCT VFR BLD CALC: 38.2 % — SIGNIFICANT CHANGE UP (ref 34.5–45)
HDLC SERPL-MCNC: 61 MG/DL — SIGNIFICANT CHANGE UP (ref 45–65)
HGB BLD-MCNC: 12.8 G/DL — SIGNIFICANT CHANGE UP (ref 11.5–15.5)
HYALINE CASTS # UR AUTO: HIGH
IMM GRANULOCYTES NFR BLD AUTO: 0.3 % — SIGNIFICANT CHANGE UP (ref 0–1.5)
KETONES UR-MCNC: NEGATIVE — SIGNIFICANT CHANGE UP
LEUKOCYTE ESTERASE UR-ACNC: SIGNIFICANT CHANGE UP
LIPID PNL WITH DIRECT LDL SERPL: 66 MG/DL — SIGNIFICANT CHANGE UP
LYMPHOCYTES # BLD AUTO: 18.2 % — SIGNIFICANT CHANGE UP (ref 13–44)
LYMPHOCYTES # BLD AUTO: 2.22 K/UL — SIGNIFICANT CHANGE UP (ref 1–3.3)
MCHC RBC-ENTMCNC: 30 PG — SIGNIFICANT CHANGE UP (ref 27–34)
MCHC RBC-ENTMCNC: 33.5 % — SIGNIFICANT CHANGE UP (ref 32–36)
MCV RBC AUTO: 89.5 FL — SIGNIFICANT CHANGE UP (ref 80–100)
METHADONE UR-MCNC: NEGATIVE — SIGNIFICANT CHANGE UP
MONOCYTES # BLD AUTO: 0.75 K/UL — SIGNIFICANT CHANGE UP (ref 0–0.9)
MONOCYTES NFR BLD AUTO: 6.1 % — SIGNIFICANT CHANGE UP (ref 2–14)
MUCOUS THREADS # UR AUTO: SIGNIFICANT CHANGE UP
NEUTROPHILS # BLD AUTO: 9.06 K/UL — HIGH (ref 1.8–7.4)
NEUTROPHILS NFR BLD AUTO: 74.2 % — SIGNIFICANT CHANGE UP (ref 43–77)
NITRITE UR-MCNC: NEGATIVE — SIGNIFICANT CHANGE UP
NRBC # FLD: 0 K/UL — SIGNIFICANT CHANGE UP (ref 0–0)
OPIATES UR-MCNC: NEGATIVE — SIGNIFICANT CHANGE UP
OXYCODONE UR-MCNC: NEGATIVE — SIGNIFICANT CHANGE UP
PCP UR-MCNC: NEGATIVE — SIGNIFICANT CHANGE UP
PH UR: 5.5 — SIGNIFICANT CHANGE UP (ref 5–8)
PLATELET # BLD AUTO: 386 K/UL — SIGNIFICANT CHANGE UP (ref 150–400)
PMV BLD: 10.7 FL — SIGNIFICANT CHANGE UP (ref 7–13)
POTASSIUM SERPL-MCNC: 4.9 MMOL/L — SIGNIFICANT CHANGE UP (ref 3.5–5.3)
POTASSIUM SERPL-SCNC: 4.9 MMOL/L — SIGNIFICANT CHANGE UP (ref 3.5–5.3)
PROT SERPL-MCNC: 7.1 G/DL — SIGNIFICANT CHANGE UP (ref 6–8.3)
PROT UR-MCNC: 20 — SIGNIFICANT CHANGE UP
RBC # BLD: 4.27 M/UL — SIGNIFICANT CHANGE UP (ref 3.8–5.2)
RBC # FLD: 12.9 % — SIGNIFICANT CHANGE UP (ref 10.3–14.5)
RBC CASTS # UR COMP ASSIST: SIGNIFICANT CHANGE UP (ref 0–?)
SARS-COV-2 RNA SPEC QL NAA+PROBE: SIGNIFICANT CHANGE UP
SODIUM SERPL-SCNC: 140 MMOL/L — SIGNIFICANT CHANGE UP (ref 135–145)
SP GR SPEC: 1.03 — SIGNIFICANT CHANGE UP (ref 1–1.04)
SQUAMOUS # UR AUTO: SIGNIFICANT CHANGE UP
TRIGL SERPL-MCNC: 58 MG/DL — SIGNIFICANT CHANGE UP (ref 10–149)
TSH SERPL-MCNC: < 0.1 UIU/ML — LOW (ref 0.27–4.2)
UROBILINOGEN FLD QL: NORMAL — SIGNIFICANT CHANGE UP
VIT B12 SERPL-MCNC: 424 PG/ML — SIGNIFICANT CHANGE UP (ref 200–900)
WBC # BLD: 12.22 K/UL — HIGH (ref 3.8–10.5)
WBC # FLD AUTO: 12.22 K/UL — HIGH (ref 3.8–10.5)
WBC UR QL: HIGH (ref 0–?)

## 2020-06-24 PROCEDURE — 99222 1ST HOSP IP/OBS MODERATE 55: CPT

## 2020-06-24 PROCEDURE — 93010 ELECTROCARDIOGRAM REPORT: CPT

## 2020-06-24 RX ORDER — FERROUS SULFATE 325(65) MG
325 TABLET ORAL DAILY
Refills: 0 | Status: DISCONTINUED | OUTPATIENT
Start: 2020-06-24 | End: 2020-07-24

## 2020-06-24 RX ORDER — ESCITALOPRAM OXALATE 10 MG/1
5 TABLET, FILM COATED ORAL DAILY
Refills: 0 | Status: DISCONTINUED | OUTPATIENT
Start: 2020-06-24 | End: 2020-06-26

## 2020-06-24 RX ORDER — ATORVASTATIN CALCIUM 80 MG/1
80 TABLET, FILM COATED ORAL AT BEDTIME
Refills: 0 | Status: DISCONTINUED | OUTPATIENT
Start: 2020-06-24 | End: 2020-07-24

## 2020-06-24 RX ORDER — MIRTAZAPINE 45 MG/1
15 TABLET, ORALLY DISINTEGRATING ORAL AT BEDTIME
Refills: 0 | Status: DISCONTINUED | OUTPATIENT
Start: 2020-06-24 | End: 2020-06-26

## 2020-06-24 RX ORDER — IBUPROFEN 200 MG
400 TABLET ORAL EVERY 6 HOURS
Refills: 0 | Status: DISCONTINUED | OUTPATIENT
Start: 2020-06-24 | End: 2020-07-24

## 2020-06-24 RX ORDER — ZOLPIDEM TARTRATE 10 MG/1
5 TABLET ORAL AT BEDTIME
Refills: 0 | Status: DISCONTINUED | OUTPATIENT
Start: 2020-06-24 | End: 2020-06-26

## 2020-06-24 RX ORDER — LEVOTHYROXINE SODIUM 125 MCG
175 TABLET ORAL DAILY
Refills: 0 | Status: DISCONTINUED | OUTPATIENT
Start: 2020-06-24 | End: 2020-07-10

## 2020-06-24 RX ADMIN — ESCITALOPRAM OXALATE 5 MILLIGRAM(S): 10 TABLET, FILM COATED ORAL at 17:06

## 2020-06-24 RX ADMIN — ATORVASTATIN CALCIUM 80 MILLIGRAM(S): 80 TABLET, FILM COATED ORAL at 20:47

## 2020-06-24 RX ADMIN — MIRTAZAPINE 15 MILLIGRAM(S): 45 TABLET, ORALLY DISINTEGRATING ORAL at 20:47

## 2020-06-24 NOTE — CHART NOTE - NSCHARTNOTEFT_GEN_A_CORE
Screening Medical Evaluation  Patient Admitted from: [facility] admitting diagnosis: Severe episode of recurrent major depressive disorder, without psychotic features    PAST MEDICAL & SURGICAL HISTORY:  History of TMJ disorder  UTI (urinary tract infection), bacterial: treated  Anxiety  Anemia  Hypercholesteremia  Post-menopausal bleeding  Hypothyroid  Deviated septum: &#x27;s  Status post breast reduction: &#x27;s  Breast cyst, left: sp surgery        Allergies    Levaquin (Rash; Hives)  sulfa drugs (Rash; Hives)    Intolerances        Social History:     FAMILY HISTORY:  Family history of CVA: father 88       MEDICATIONS  (STANDING):  atorvastatin 80 milliGRAM(s) Oral at bedtime  escitalopram 5 milliGRAM(s) Oral daily  ferrous    sulfate 325 milliGRAM(s) Oral daily  levothyroxine 175 MICROGram(s) Oral daily  mirtazapine 15 milliGRAM(s) Oral at bedtime    MEDICATIONS  (PRN):  ibuprofen  Tablet. 400 milliGRAM(s) Oral every 6 hours PRN Mild Pain (1 - 3), Moderate Pain (4 - 6)  LORazepam     Tablet 1 milliGRAM(s) Oral every 6 hours PRN anxiety  LORazepam   Injectable 2 milliGRAM(s) IntraMuscular every 6 hours PRN agitation  zolpidem 5 milliGRAM(s) Oral at bedtime PRN Insomnia      Vital Signs Last 24 Hrs  T(C): 36.1 (2020 15:25), Max: 36.1 (2020 15:25)  T(F): 97 (2020 15:25), Max: 97 (2020 15:25)  HR: --  BP: --  BP(mean): --  RR: 16 (2020 15:25) (16 - 16)  SpO2: 99% (2020 15:25) (99% - 99%)  CAPILLARY BLOOD GLUCOSE            PHYSICAL EXAM:  GENERAL: NAD, well-developed  HEAD:  Atraumatic, Normocephalic  EYES: EOMI, PERRLA, conjunctiva and sclera clear  NECK: Supple, No JVD  CHEST/LUNG: Clear to auscultation bilaterally; No wheeze  HEART: Regular rate and rhythm; No murmurs, rubs, or gallops  ABDOMEN: Soft, Nontender, Nondistended; Bowel sounds present  EXTREMITIES:  2+ Peripheral Pulses, No clubbing, cyanosis, or edema  PSYCH: AAOx3  NEUROLOGY: non-focal  SKIN: No rashes or lesions    LABS:                        12.8   12.22 )-----------( 386      ( 2020 17:45 )             38.2     06-    140  |  103  |  19  ----------------------------<  147<H>  4.9   |  23  |  0.58    Ca    10.0      2020 17:45    TPro  7.1  /  Alb  4.2  /  TBili  0.2  /  DBili  x   /  AST  20  /  ALT  26  /  AlkPhos  94  -          Urinalysis Basic - ( 2020 18:00 )    Color: YELLOW / Appearance: CLEAR / S.030 / pH: 5.5  Gluc: NEGATIVE / Ketone: NEGATIVE  / Bili: NEGATIVE / Urobili: NORMAL   Blood: NEGATIVE / Protein: 20 / Nitrite: NEGATIVE   Leuk Esterase: SMALL / RBC: 0-2 / WBC 6-10   Sq Epi: MODERATE / Non Sq Epi: x / Bacteria: SMALL        RADIOLOGY & ADDITIONAL TESTS:    Assessment and Plan:  55 year old female with a PMHx of Hyperlipidemia, Hypothyroidism presents to [facility] with an admitting diagnosis of Severe episode of recurrent major depressive disorder, without psychotic features. Pt has no medical complaints at this time including fevers, headache, dizziness, changes in vision, chest pain, SOB, abdominal pain, N/V/D/C, dysuria.     1. Severe episode of recurrent major depressive disorder, without psychotic features- follow plan as per primary team  2. Hyperlipidemia- continue Lipitor 80mg QHS  3, Hypothyroidism- continue Synthroid 175mcg daily

## 2020-06-25 DIAGNOSIS — F33.2 MAJOR DEPRESSIVE DISORDER, RECURRENT SEVERE WITHOUT PSYCHOTIC FEATURES: ICD-10-CM

## 2020-06-25 LAB — 24R-OH-CALCIDIOL SERPL-MCNC: 81.4 NG/ML — HIGH (ref 30–80)

## 2020-06-25 PROCEDURE — 99232 SBSQ HOSP IP/OBS MODERATE 35: CPT

## 2020-06-25 RX ADMIN — MIRTAZAPINE 15 MILLIGRAM(S): 45 TABLET, ORALLY DISINTEGRATING ORAL at 20:41

## 2020-06-25 RX ADMIN — Medication 175 MICROGRAM(S): at 09:40

## 2020-06-25 RX ADMIN — Medication 325 MILLIGRAM(S): at 09:40

## 2020-06-25 RX ADMIN — ATORVASTATIN CALCIUM 80 MILLIGRAM(S): 80 TABLET, FILM COATED ORAL at 20:41

## 2020-06-25 RX ADMIN — Medication 1 MILLIGRAM(S): at 05:20

## 2020-06-25 RX ADMIN — ESCITALOPRAM OXALATE 5 MILLIGRAM(S): 10 TABLET, FILM COATED ORAL at 11:03

## 2020-06-26 LAB
T3 SERPL-MCNC: 100.6 NG/DL — SIGNIFICANT CHANGE UP (ref 80–200)
T4 AB SER-ACNC: 10.13 UG/DL — SIGNIFICANT CHANGE UP (ref 5.1–13)
THYROPEROXIDASE AB SERPL-ACNC: 74.7 IU/ML — HIGH
TSH SERPL-MCNC: < 0.1 UIU/ML — LOW (ref 0.27–4.2)

## 2020-06-26 PROCEDURE — 99232 SBSQ HOSP IP/OBS MODERATE 35: CPT

## 2020-06-26 RX ORDER — ESCITALOPRAM OXALATE 10 MG/1
10 TABLET, FILM COATED ORAL DAILY
Refills: 0 | Status: DISCONTINUED | OUTPATIENT
Start: 2020-06-26 | End: 2020-06-29

## 2020-06-26 RX ORDER — LANOLIN ALCOHOL/MO/W.PET/CERES
5 CREAM (GRAM) TOPICAL AT BEDTIME
Refills: 0 | Status: DISCONTINUED | OUTPATIENT
Start: 2020-06-26 | End: 2020-07-24

## 2020-06-26 RX ORDER — QUETIAPINE FUMARATE 200 MG/1
50 TABLET, FILM COATED ORAL AT BEDTIME
Refills: 0 | Status: DISCONTINUED | OUTPATIENT
Start: 2020-06-26 | End: 2020-06-29

## 2020-06-26 RX ADMIN — Medication 325 MILLIGRAM(S): at 10:17

## 2020-06-26 RX ADMIN — ATORVASTATIN CALCIUM 80 MILLIGRAM(S): 80 TABLET, FILM COATED ORAL at 20:36

## 2020-06-26 RX ADMIN — QUETIAPINE FUMARATE 50 MILLIGRAM(S): 200 TABLET, FILM COATED ORAL at 20:36

## 2020-06-26 RX ADMIN — Medication 175 MICROGRAM(S): at 10:17

## 2020-06-26 RX ADMIN — ESCITALOPRAM OXALATE 5 MILLIGRAM(S): 10 TABLET, FILM COATED ORAL at 10:17

## 2020-06-27 PROCEDURE — 99231 SBSQ HOSP IP/OBS SF/LOW 25: CPT

## 2020-06-27 RX ADMIN — Medication 175 MICROGRAM(S): at 09:27

## 2020-06-27 RX ADMIN — ESCITALOPRAM OXALATE 10 MILLIGRAM(S): 10 TABLET, FILM COATED ORAL at 09:27

## 2020-06-27 RX ADMIN — ATORVASTATIN CALCIUM 80 MILLIGRAM(S): 80 TABLET, FILM COATED ORAL at 20:42

## 2020-06-27 RX ADMIN — QUETIAPINE FUMARATE 50 MILLIGRAM(S): 200 TABLET, FILM COATED ORAL at 20:41

## 2020-06-27 RX ADMIN — Medication 325 MILLIGRAM(S): at 09:27

## 2020-06-28 PROCEDURE — 99231 SBSQ HOSP IP/OBS SF/LOW 25: CPT

## 2020-06-28 RX ADMIN — ESCITALOPRAM OXALATE 10 MILLIGRAM(S): 10 TABLET, FILM COATED ORAL at 08:52

## 2020-06-28 RX ADMIN — Medication 175 MICROGRAM(S): at 08:52

## 2020-06-28 RX ADMIN — QUETIAPINE FUMARATE 50 MILLIGRAM(S): 200 TABLET, FILM COATED ORAL at 20:42

## 2020-06-28 RX ADMIN — Medication 325 MILLIGRAM(S): at 08:52

## 2020-06-28 RX ADMIN — ATORVASTATIN CALCIUM 80 MILLIGRAM(S): 80 TABLET, FILM COATED ORAL at 20:42

## 2020-06-29 PROCEDURE — 99232 SBSQ HOSP IP/OBS MODERATE 35: CPT

## 2020-06-29 RX ORDER — QUETIAPINE FUMARATE 200 MG/1
75 TABLET, FILM COATED ORAL AT BEDTIME
Refills: 0 | Status: DISCONTINUED | OUTPATIENT
Start: 2020-06-29 | End: 2020-06-30

## 2020-06-29 RX ORDER — ESCITALOPRAM OXALATE 10 MG/1
15 TABLET, FILM COATED ORAL DAILY
Refills: 0 | Status: DISCONTINUED | OUTPATIENT
Start: 2020-06-29 | End: 2020-07-02

## 2020-06-29 RX ADMIN — ESCITALOPRAM OXALATE 10 MILLIGRAM(S): 10 TABLET, FILM COATED ORAL at 09:36

## 2020-06-29 RX ADMIN — Medication 325 MILLIGRAM(S): at 09:36

## 2020-06-29 RX ADMIN — Medication 175 MICROGRAM(S): at 09:36

## 2020-06-29 RX ADMIN — QUETIAPINE FUMARATE 75 MILLIGRAM(S): 200 TABLET, FILM COATED ORAL at 20:42

## 2020-06-29 RX ADMIN — ATORVASTATIN CALCIUM 80 MILLIGRAM(S): 80 TABLET, FILM COATED ORAL at 20:42

## 2020-06-30 PROCEDURE — 99232 SBSQ HOSP IP/OBS MODERATE 35: CPT

## 2020-06-30 RX ORDER — OLANZAPINE 15 MG/1
5 TABLET, FILM COATED ORAL AT BEDTIME
Refills: 0 | Status: DISCONTINUED | OUTPATIENT
Start: 2020-06-30 | End: 2020-07-02

## 2020-06-30 RX ADMIN — Medication 175 MICROGRAM(S): at 09:34

## 2020-06-30 RX ADMIN — ESCITALOPRAM OXALATE 15 MILLIGRAM(S): 10 TABLET, FILM COATED ORAL at 09:34

## 2020-06-30 RX ADMIN — ATORVASTATIN CALCIUM 80 MILLIGRAM(S): 80 TABLET, FILM COATED ORAL at 20:31

## 2020-06-30 RX ADMIN — Medication 325 MILLIGRAM(S): at 09:34

## 2020-06-30 RX ADMIN — OLANZAPINE 5 MILLIGRAM(S): 15 TABLET, FILM COATED ORAL at 20:31

## 2020-07-01 PROCEDURE — 99232 SBSQ HOSP IP/OBS MODERATE 35: CPT

## 2020-07-01 PROCEDURE — 90834 PSYTX W PT 45 MINUTES: CPT

## 2020-07-01 RX ADMIN — Medication 325 MILLIGRAM(S): at 09:25

## 2020-07-01 RX ADMIN — ATORVASTATIN CALCIUM 80 MILLIGRAM(S): 80 TABLET, FILM COATED ORAL at 21:24

## 2020-07-01 RX ADMIN — OLANZAPINE 5 MILLIGRAM(S): 15 TABLET, FILM COATED ORAL at 21:24

## 2020-07-01 RX ADMIN — ESCITALOPRAM OXALATE 15 MILLIGRAM(S): 10 TABLET, FILM COATED ORAL at 09:25

## 2020-07-01 RX ADMIN — Medication 175 MICROGRAM(S): at 09:25

## 2020-07-02 PROCEDURE — 99232 SBSQ HOSP IP/OBS MODERATE 35: CPT

## 2020-07-02 RX ORDER — OLANZAPINE 15 MG/1
5 TABLET, FILM COATED ORAL AT BEDTIME
Refills: 0 | Status: COMPLETED | OUTPATIENT
Start: 2020-07-02 | End: 2020-07-03

## 2020-07-02 RX ORDER — ESCITALOPRAM OXALATE 10 MG/1
20 TABLET, FILM COATED ORAL DAILY
Refills: 0 | Status: DISCONTINUED | OUTPATIENT
Start: 2020-07-02 | End: 2020-07-08

## 2020-07-02 RX ORDER — OLANZAPINE 15 MG/1
7.5 TABLET, FILM COATED ORAL AT BEDTIME
Refills: 0 | Status: DISCONTINUED | OUTPATIENT
Start: 2020-07-04 | End: 2020-07-20

## 2020-07-02 RX ADMIN — Medication 325 MILLIGRAM(S): at 08:33

## 2020-07-02 RX ADMIN — Medication 175 MICROGRAM(S): at 08:33

## 2020-07-02 RX ADMIN — ATORVASTATIN CALCIUM 80 MILLIGRAM(S): 80 TABLET, FILM COATED ORAL at 21:25

## 2020-07-02 RX ADMIN — ESCITALOPRAM OXALATE 15 MILLIGRAM(S): 10 TABLET, FILM COATED ORAL at 08:33

## 2020-07-02 RX ADMIN — OLANZAPINE 5 MILLIGRAM(S): 15 TABLET, FILM COATED ORAL at 21:25

## 2020-07-03 RX ORDER — ACETAMINOPHEN 500 MG
650 TABLET ORAL EVERY 6 HOURS
Refills: 0 | Status: DISCONTINUED | OUTPATIENT
Start: 2020-07-03 | End: 2020-07-24

## 2020-07-03 RX ADMIN — Medication 650 MILLIGRAM(S): at 18:30

## 2020-07-03 RX ADMIN — ESCITALOPRAM OXALATE 20 MILLIGRAM(S): 10 TABLET, FILM COATED ORAL at 09:53

## 2020-07-03 RX ADMIN — Medication 325 MILLIGRAM(S): at 09:53

## 2020-07-03 RX ADMIN — Medication 175 MICROGRAM(S): at 09:53

## 2020-07-03 RX ADMIN — ATORVASTATIN CALCIUM 80 MILLIGRAM(S): 80 TABLET, FILM COATED ORAL at 21:20

## 2020-07-03 RX ADMIN — OLANZAPINE 5 MILLIGRAM(S): 15 TABLET, FILM COATED ORAL at 21:20

## 2020-07-04 RX ADMIN — Medication 175 MICROGRAM(S): at 09:23

## 2020-07-04 RX ADMIN — ESCITALOPRAM OXALATE 20 MILLIGRAM(S): 10 TABLET, FILM COATED ORAL at 09:23

## 2020-07-04 RX ADMIN — OLANZAPINE 7.5 MILLIGRAM(S): 15 TABLET, FILM COATED ORAL at 20:51

## 2020-07-04 RX ADMIN — ATORVASTATIN CALCIUM 80 MILLIGRAM(S): 80 TABLET, FILM COATED ORAL at 20:51

## 2020-07-04 RX ADMIN — Medication 325 MILLIGRAM(S): at 09:23

## 2020-07-05 LAB
CULTURE RESULTS: SIGNIFICANT CHANGE UP
SPECIMEN SOURCE: SIGNIFICANT CHANGE UP

## 2020-07-05 RX ADMIN — Medication 175 MICROGRAM(S): at 11:05

## 2020-07-05 RX ADMIN — ESCITALOPRAM OXALATE 20 MILLIGRAM(S): 10 TABLET, FILM COATED ORAL at 11:05

## 2020-07-05 RX ADMIN — ATORVASTATIN CALCIUM 80 MILLIGRAM(S): 80 TABLET, FILM COATED ORAL at 20:42

## 2020-07-05 RX ADMIN — OLANZAPINE 7.5 MILLIGRAM(S): 15 TABLET, FILM COATED ORAL at 20:42

## 2020-07-05 RX ADMIN — Medication 325 MILLIGRAM(S): at 11:05

## 2020-07-06 LAB
ALBUMIN SERPL ELPH-MCNC: 4 G/DL — SIGNIFICANT CHANGE UP (ref 3.3–5)
ALP SERPL-CCNC: 86 U/L — SIGNIFICANT CHANGE UP (ref 40–120)
ALT FLD-CCNC: 32 U/L — SIGNIFICANT CHANGE UP (ref 4–33)
ANION GAP SERPL CALC-SCNC: 13 MMO/L — SIGNIFICANT CHANGE UP (ref 7–14)
AST SERPL-CCNC: 29 U/L — SIGNIFICANT CHANGE UP (ref 4–32)
BASOPHILS # BLD AUTO: 0.04 K/UL — SIGNIFICANT CHANGE UP (ref 0–0.2)
BASOPHILS NFR BLD AUTO: 0.5 % — SIGNIFICANT CHANGE UP (ref 0–2)
BILIRUB SERPL-MCNC: 0.4 MG/DL — SIGNIFICANT CHANGE UP (ref 0.2–1.2)
BUN SERPL-MCNC: 27 MG/DL — HIGH (ref 7–23)
CALCIUM SERPL-MCNC: 9.5 MG/DL — SIGNIFICANT CHANGE UP (ref 8.4–10.5)
CHLORIDE SERPL-SCNC: 100 MMOL/L — SIGNIFICANT CHANGE UP (ref 98–107)
CO2 SERPL-SCNC: 27 MMOL/L — SIGNIFICANT CHANGE UP (ref 22–31)
CREAT SERPL-MCNC: 0.6 MG/DL — SIGNIFICANT CHANGE UP (ref 0.5–1.3)
EOSINOPHIL # BLD AUTO: 0.1 K/UL — SIGNIFICANT CHANGE UP (ref 0–0.5)
EOSINOPHIL NFR BLD AUTO: 1.2 % — SIGNIFICANT CHANGE UP (ref 0–6)
GLUCOSE SERPL-MCNC: 146 MG/DL — HIGH (ref 70–99)
HCT VFR BLD CALC: 39.3 % — SIGNIFICANT CHANGE UP (ref 34.5–45)
HGB BLD-MCNC: 13.4 G/DL — SIGNIFICANT CHANGE UP (ref 11.5–15.5)
IMM GRANULOCYTES NFR BLD AUTO: 0.2 % — SIGNIFICANT CHANGE UP (ref 0–1.5)
LYMPHOCYTES # BLD AUTO: 1.59 K/UL — SIGNIFICANT CHANGE UP (ref 1–3.3)
LYMPHOCYTES # BLD AUTO: 19.1 % — SIGNIFICANT CHANGE UP (ref 13–44)
MCHC RBC-ENTMCNC: 29.9 PG — SIGNIFICANT CHANGE UP (ref 27–34)
MCHC RBC-ENTMCNC: 34.1 % — SIGNIFICANT CHANGE UP (ref 32–36)
MCV RBC AUTO: 87.7 FL — SIGNIFICANT CHANGE UP (ref 80–100)
MONOCYTES # BLD AUTO: 0.74 K/UL — SIGNIFICANT CHANGE UP (ref 0–0.9)
MONOCYTES NFR BLD AUTO: 8.9 % — SIGNIFICANT CHANGE UP (ref 2–14)
NEUTROPHILS # BLD AUTO: 5.82 K/UL — SIGNIFICANT CHANGE UP (ref 1.8–7.4)
NEUTROPHILS NFR BLD AUTO: 70.1 % — SIGNIFICANT CHANGE UP (ref 43–77)
NRBC # FLD: 0 K/UL — SIGNIFICANT CHANGE UP (ref 0–0)
PLATELET # BLD AUTO: 352 K/UL — SIGNIFICANT CHANGE UP (ref 150–400)
PMV BLD: 10.6 FL — SIGNIFICANT CHANGE UP (ref 7–13)
POTASSIUM SERPL-MCNC: 4.2 MMOL/L — SIGNIFICANT CHANGE UP (ref 3.5–5.3)
POTASSIUM SERPL-SCNC: 4.2 MMOL/L — SIGNIFICANT CHANGE UP (ref 3.5–5.3)
PROT SERPL-MCNC: 7 G/DL — SIGNIFICANT CHANGE UP (ref 6–8.3)
RBC # BLD: 4.48 M/UL — SIGNIFICANT CHANGE UP (ref 3.8–5.2)
RBC # FLD: 12.5 % — SIGNIFICANT CHANGE UP (ref 10.3–14.5)
SODIUM SERPL-SCNC: 140 MMOL/L — SIGNIFICANT CHANGE UP (ref 135–145)
WBC # BLD: 8.31 K/UL — SIGNIFICANT CHANGE UP (ref 3.8–10.5)
WBC # FLD AUTO: 8.31 K/UL — SIGNIFICANT CHANGE UP (ref 3.8–10.5)

## 2020-07-06 PROCEDURE — 99231 SBSQ HOSP IP/OBS SF/LOW 25: CPT

## 2020-07-06 PROCEDURE — 93010 ELECTROCARDIOGRAM REPORT: CPT

## 2020-07-06 RX ADMIN — ESCITALOPRAM OXALATE 20 MILLIGRAM(S): 10 TABLET, FILM COATED ORAL at 10:18

## 2020-07-06 RX ADMIN — Medication 325 MILLIGRAM(S): at 10:18

## 2020-07-06 RX ADMIN — ATORVASTATIN CALCIUM 80 MILLIGRAM(S): 80 TABLET, FILM COATED ORAL at 20:44

## 2020-07-06 RX ADMIN — Medication 175 MICROGRAM(S): at 10:18

## 2020-07-06 RX ADMIN — OLANZAPINE 7.5 MILLIGRAM(S): 15 TABLET, FILM COATED ORAL at 20:44

## 2020-07-07 LAB
HCG UR-SCNC: NEGATIVE — SIGNIFICANT CHANGE UP
SARS-COV-2 RNA SPEC QL NAA+PROBE: SIGNIFICANT CHANGE UP
SP GR UR: 1.03 — SIGNIFICANT CHANGE UP (ref 1–1.04)

## 2020-07-07 PROCEDURE — 99232 SBSQ HOSP IP/OBS MODERATE 35: CPT

## 2020-07-07 RX ADMIN — OLANZAPINE 7.5 MILLIGRAM(S): 15 TABLET, FILM COATED ORAL at 20:54

## 2020-07-07 RX ADMIN — ATORVASTATIN CALCIUM 80 MILLIGRAM(S): 80 TABLET, FILM COATED ORAL at 20:54

## 2020-07-07 RX ADMIN — Medication 325 MILLIGRAM(S): at 09:58

## 2020-07-07 RX ADMIN — Medication 175 MICROGRAM(S): at 09:58

## 2020-07-07 RX ADMIN — ESCITALOPRAM OXALATE 20 MILLIGRAM(S): 10 TABLET, FILM COATED ORAL at 09:58

## 2020-07-08 PROCEDURE — 99232 SBSQ HOSP IP/OBS MODERATE 35: CPT

## 2020-07-08 PROCEDURE — 90834 PSYTX W PT 45 MINUTES: CPT

## 2020-07-08 RX ORDER — ESCITALOPRAM OXALATE 10 MG/1
10 TABLET, FILM COATED ORAL DAILY
Refills: 0 | Status: DISCONTINUED | OUTPATIENT
Start: 2020-07-08 | End: 2020-07-10

## 2020-07-08 RX ORDER — SERTRALINE 25 MG/1
75 TABLET, FILM COATED ORAL DAILY
Refills: 0 | Status: DISCONTINUED | OUTPATIENT
Start: 2020-07-08 | End: 2020-07-10

## 2020-07-08 RX ADMIN — Medication 325 MILLIGRAM(S): at 10:12

## 2020-07-08 RX ADMIN — OLANZAPINE 7.5 MILLIGRAM(S): 15 TABLET, FILM COATED ORAL at 21:12

## 2020-07-08 RX ADMIN — ATORVASTATIN CALCIUM 80 MILLIGRAM(S): 80 TABLET, FILM COATED ORAL at 21:12

## 2020-07-08 RX ADMIN — ESCITALOPRAM OXALATE 20 MILLIGRAM(S): 10 TABLET, FILM COATED ORAL at 10:12

## 2020-07-08 RX ADMIN — Medication 175 MICROGRAM(S): at 10:12

## 2020-07-09 PROCEDURE — 99231 SBSQ HOSP IP/OBS SF/LOW 25: CPT

## 2020-07-09 RX ADMIN — OLANZAPINE 7.5 MILLIGRAM(S): 15 TABLET, FILM COATED ORAL at 21:06

## 2020-07-09 RX ADMIN — Medication 175 MICROGRAM(S): at 09:43

## 2020-07-09 RX ADMIN — ESCITALOPRAM OXALATE 10 MILLIGRAM(S): 10 TABLET, FILM COATED ORAL at 09:42

## 2020-07-09 RX ADMIN — ATORVASTATIN CALCIUM 80 MILLIGRAM(S): 80 TABLET, FILM COATED ORAL at 21:06

## 2020-07-09 RX ADMIN — Medication 325 MILLIGRAM(S): at 09:43

## 2020-07-09 RX ADMIN — SERTRALINE 75 MILLIGRAM(S): 25 TABLET, FILM COATED ORAL at 09:43

## 2020-07-10 LAB
HCG SERPL-ACNC: < 5 MIU/ML — SIGNIFICANT CHANGE UP
SARS-COV-2 RNA SPEC QL NAA+PROBE: SIGNIFICANT CHANGE UP
T3 SERPL-MCNC: 106.9 NG/DL — SIGNIFICANT CHANGE UP (ref 80–200)
T4 AB SER-ACNC: 11.58 UG/DL — SIGNIFICANT CHANGE UP (ref 5.1–13)
T4 FREE SERPL-MCNC: 2.34 NG/DL — HIGH (ref 0.9–1.8)
TSH SERPL-MCNC: < 0.1 UIU/ML — LOW (ref 0.27–4.2)

## 2020-07-10 PROCEDURE — 90870 ELECTROCONVULSIVE THERAPY: CPT

## 2020-07-10 PROCEDURE — 99232 SBSQ HOSP IP/OBS MODERATE 35: CPT | Mod: 25

## 2020-07-10 RX ORDER — MIDAZOLAM HYDROCHLORIDE 1 MG/ML
2 INJECTION, SOLUTION INTRAMUSCULAR; INTRAVENOUS ONCE
Refills: 0 | Status: DISCONTINUED | OUTPATIENT
Start: 2020-07-10 | End: 2020-07-10

## 2020-07-10 RX ORDER — SERTRALINE 25 MG/1
100 TABLET, FILM COATED ORAL DAILY
Refills: 0 | Status: COMPLETED | OUTPATIENT
Start: 2020-07-10 | End: 2020-07-12

## 2020-07-10 RX ORDER — SERTRALINE 25 MG/1
125 TABLET, FILM COATED ORAL DAILY
Refills: 0 | Status: DISCONTINUED | OUTPATIENT
Start: 2020-07-13 | End: 2020-07-24

## 2020-07-10 RX ADMIN — ATORVASTATIN CALCIUM 80 MILLIGRAM(S): 80 TABLET, FILM COATED ORAL at 21:15

## 2020-07-10 RX ADMIN — Medication 175 MICROGRAM(S): at 11:19

## 2020-07-10 RX ADMIN — ESCITALOPRAM OXALATE 10 MILLIGRAM(S): 10 TABLET, FILM COATED ORAL at 11:19

## 2020-07-10 RX ADMIN — Medication 325 MILLIGRAM(S): at 11:19

## 2020-07-10 RX ADMIN — SERTRALINE 75 MILLIGRAM(S): 25 TABLET, FILM COATED ORAL at 11:19

## 2020-07-10 RX ADMIN — OLANZAPINE 7.5 MILLIGRAM(S): 15 TABLET, FILM COATED ORAL at 21:15

## 2020-07-10 NOTE — CHART NOTE - NSCHARTNOTEFT_GEN_A_CORE
D/W Dr Vo, pt has h/o hypothyroidism due to Hashimoto thyroiditis on Synthroid 175mcg daily, now has  TSH<0.01, FT4 2.34. D/W Dr Forrest, Endo attending, rec to d/c Synthroid over weekend and check TSH, free T4 on  Monday. If FT4 WNL, will reduce dosage of synthroid based on weight. Medicine will consult on this pt on Monday.

## 2020-07-11 RX ADMIN — SERTRALINE 100 MILLIGRAM(S): 25 TABLET, FILM COATED ORAL at 08:34

## 2020-07-11 RX ADMIN — Medication 5 MILLIGRAM(S): at 21:37

## 2020-07-11 RX ADMIN — Medication 325 MILLIGRAM(S): at 08:34

## 2020-07-11 RX ADMIN — ATORVASTATIN CALCIUM 80 MILLIGRAM(S): 80 TABLET, FILM COATED ORAL at 20:27

## 2020-07-11 RX ADMIN — OLANZAPINE 7.5 MILLIGRAM(S): 15 TABLET, FILM COATED ORAL at 20:27

## 2020-07-12 RX ADMIN — OLANZAPINE 7.5 MILLIGRAM(S): 15 TABLET, FILM COATED ORAL at 20:54

## 2020-07-12 RX ADMIN — ATORVASTATIN CALCIUM 80 MILLIGRAM(S): 80 TABLET, FILM COATED ORAL at 20:54

## 2020-07-12 RX ADMIN — Medication 325 MILLIGRAM(S): at 08:37

## 2020-07-13 LAB
T4 FREE SERPL-MCNC: 1.6 NG/DL — SIGNIFICANT CHANGE UP (ref 0.9–1.8)
TSH SERPL-MCNC: < 0.1 UIU/ML — LOW (ref 0.27–4.2)

## 2020-07-13 PROCEDURE — 99233 SBSQ HOSP IP/OBS HIGH 50: CPT

## 2020-07-13 PROCEDURE — 90870 ELECTROCONVULSIVE THERAPY: CPT

## 2020-07-13 RX ORDER — LEVOTHYROXINE SODIUM 125 MCG
75 TABLET ORAL
Refills: 0 | Status: DISCONTINUED | OUTPATIENT
Start: 2020-07-13 | End: 2020-07-24

## 2020-07-13 RX ADMIN — Medication 650 MILLIGRAM(S): at 13:55

## 2020-07-13 RX ADMIN — Medication 5 MILLIGRAM(S): at 20:56

## 2020-07-13 RX ADMIN — OLANZAPINE 7.5 MILLIGRAM(S): 15 TABLET, FILM COATED ORAL at 20:12

## 2020-07-13 RX ADMIN — Medication 325 MILLIGRAM(S): at 08:56

## 2020-07-13 RX ADMIN — ATORVASTATIN CALCIUM 80 MILLIGRAM(S): 80 TABLET, FILM COATED ORAL at 20:12

## 2020-07-13 NOTE — CONSULT NOTE ADULT - SUBJECTIVE AND OBJECTIVE BOX
HPI: 55 year old female with a PMHx of Hyperlipidemia, Hypothyroidism presents to [facility] with an admitting diagnosis of Severe episode of recurrent major depressive disorder, without psychotic features. Pt has no medical complaints at this time including fevers, headache, dizziness, changes in vision, chest pain, SOB, abdominal pain, N/V/D/C, dysuria.     PAST MEDICAL & SURGICAL HISTORY:  History of TMJ disorder  UTI (urinary tract infection), bacterial: treated  Anxiety  Anemia  Hypercholesteremia  Post-menopausal bleeding  Hypothyroid  Deviated septum: &#x27;s  Status post breast reduction: &#x27;s  Breast cyst, left: sp surgery      Review of Systems:   CONSTITUTIONAL: No fever, weight loss, or fatigue  EYES: No eye pain, visual disturbances, or discharge  ENMT:  No difficulty hearing, tinnitus, vertigo; No sinus or throat pain  NECK: No pain or stiffness  RESPIRATORY: No cough, wheezing, chills or hemoptysis; No shortness of breath  CARDIOVASCULAR: No chest pain, palpitations, dizziness, or leg swelling  GASTROINTESTINAL: No abdominal or epigastric pain. No nausea, vomiting, or hematemesis; No diarrhea or constipation. No melena or hematochezia.  GENITOURINARY: No dysuria, frequency, hematuria, or incontinence  NEUROLOGICAL: No headaches, memory loss, loss of strength, numbness, or tremors  SKIN: No itching, burning, rashes, or lesions   LYMPH NODES: No enlarged glands  ENDOCRINE: No heat or cold intolerance; No hair loss  MUSCULOSKELETAL: No joint pain or swelling; No muscle, back, or extremity pain  HEME/LYMPH: No easy bruising, or bleeding gums  ALLERY AND IMMUNOLOGIC: No hives or eczema    Allergies    Levaquin (Rash; Hives)  sulfa drugs (Rash; Hives)    Intolerances        Social History:     FAMILY HISTORY:  Family history of CVA: father 88       MEDICATIONS  (STANDING):  atorvastatin 80 milliGRAM(s) Oral at bedtime  ferrous    sulfate 325 milliGRAM(s) Oral daily  OLANZapine 7.5 milliGRAM(s) Oral at bedtime  sertraline 125 milliGRAM(s) Oral daily    MEDICATIONS  (PRN):  acetaminophen   Tablet .. 650 milliGRAM(s) Oral every 6 hours PRN Temp greater or equal to 38C (100.4F), Mild Pain (1 - 3), Moderate Pain (4 - 6), Severe Pain (7 - 10)  ibuprofen  Tablet. 400 milliGRAM(s) Oral every 6 hours PRN Mild Pain (1 - 3), Moderate Pain (4 - 6)  LORazepam     Tablet 1 milliGRAM(s) Oral every 6 hours PRN anxiety  LORazepam   Injectable 2 milliGRAM(s) IntraMuscular every 6 hours PRN agitation  melatonin. 5 milliGRAM(s) Oral at bedtime PRN Insomnia      Vital Signs Last 24 Hrs  T(C): 36.7 (2020 06:45), Max: 36.8 (2020 17:33)  T(F): 98 (2020 06:45), Max: 98.2 (2020 17:33)  HR: 62 (2020 19:37) (62 - 62)  BP: 90/62 (2020 19:37) (90/62 - 90/62)  BP(mean): --  RR: --  SpO2: --  CAPILLARY BLOOD GLUCOSE            PHYSICAL EXAM:  GENERAL: NAD, well-developed  HEAD:  Atraumatic, Normocephalic  EYES: EOMI, conjunctiva and sclera clear  NECK: Supple, No JVD  CHEST/LUNG: Clear to auscultation bilaterally; No wheeze  HEART: Regular rate and rhythm; No murmurs, rubs, or gallops  ABDOMEN: Soft, Nontender, Nondistended; Bowel sounds present  EXTREMITIES:  2+ Peripheral Pulses, No clubbing, cyanosis, or edema  NEUROLOGY: non-focal  SKIN: No rashes or lesions    LABS:                    EKG(personally reviewed):    RADIOLOGY & ADDITIONAL TESTS:    Imaging Personally Reviewed:    Consultant(s) Notes Reviewed:      Care Discussed with Consultants/Other Providers: HPI: 55 year old female with a PMHx of Hyperlipidemia, Hypothyroidism presents to [facility] with an admitting diagnosis of Severe episode of recurrent major depressive disorder, without psychotic features. Pt has no medical complaints at this time including fevers, headache, dizziness, changes in vision, chest pain, SOB, abdominal pain, N/V/D/C, dysuria.     PAST MEDICAL & SURGICAL HISTORY:  History of TMJ disorder  UTI (urinary tract infection), bacterial: treated  Anxiety  Anemia  Hypercholesteremia  Post-menopausal bleeding  Hypothyroid  Deviated septum: &#x27;s  Status post breast reduction: &#x27;s  Breast cyst, left: sp surgery      Review of Systems:   CONSTITUTIONAL: No fever, weight loss, or fatigue  EYES: No eye pain, visual disturbances, or discharge  ENMT:  No difficulty hearing, tinnitus, vertigo; No sinus or throat pain  NECK: No pain or stiffness  RESPIRATORY: No cough, wheezing, chills or hemoptysis; No shortness of breath  CARDIOVASCULAR: No chest pain, palpitations, dizziness, or leg swelling  GASTROINTESTINAL: No abdominal or epigastric pain. No nausea, vomiting, or hematemesis; No diarrhea or constipation. No melena or hematochezia.  GENITOURINARY: No dysuria, frequency, hematuria, or incontinence  NEUROLOGICAL: No headaches, memory loss, loss of strength, numbness, or tremors  SKIN: No itching, burning, rashes, or lesions   LYMPH NODES: No enlarged glands  ENDOCRINE: No heat or cold intolerance; No hair loss  MUSCULOSKELETAL: No joint pain or swelling; No muscle, back, or extremity pain  HEME/LYMPH: No easy bruising, or bleeding gums  ALLERY AND IMMUNOLOGIC: No hives or eczema    Allergies    Levaquin (Rash; Hives)  sulfa drugs (Rash; Hives)    Intolerances        Social History:     FAMILY HISTORY:  Family history of CVA: father 88       MEDICATIONS  (STANDING):  atorvastatin 80 milliGRAM(s) Oral at bedtime  ferrous    sulfate 325 milliGRAM(s) Oral daily  OLANZapine 7.5 milliGRAM(s) Oral at bedtime  sertraline 125 milliGRAM(s) Oral daily    MEDICATIONS  (PRN):  acetaminophen   Tablet .. 650 milliGRAM(s) Oral every 6 hours PRN Temp greater or equal to 38C (100.4F), Mild Pain (1 - 3), Moderate Pain (4 - 6), Severe Pain (7 - 10)  ibuprofen  Tablet. 400 milliGRAM(s) Oral every 6 hours PRN Mild Pain (1 - 3), Moderate Pain (4 - 6)  LORazepam     Tablet 1 milliGRAM(s) Oral every 6 hours PRN anxiety  LORazepam   Injectable 2 milliGRAM(s) IntraMuscular every 6 hours PRN agitation  melatonin. 5 milliGRAM(s) Oral at bedtime PRN Insomnia      Vital Signs Last 24 Hrs  T(C): 36.7 (2020 06:45), Max: 36.8 (2020 17:33)  T(F): 98 (2020 06:45), Max: 98.2 (2020 17:33)  HR: 62 (2020 19:37) (62 - 62)  BP: 90/62 (2020 19:37) (90/62 - 90/62)  BP(mean): --  RR: --  SpO2: --  CAPILLARY BLOOD GLUCOSE            PHYSICAL EXAM:  GENERAL: NAD, well-developed  HEAD:  Atraumatic, Normocephalic  EYES: EOMI, conjunctiva and sclera clear  NECK: Supple, No JVD  CHEST/LUNG: Clear to auscultation bilaterally; No wheeze  HEART: Regular rate and rhythm; No murmurs, rubs, or gallops  ABDOMEN: Soft, Nontender, Nondistended; Bowel sounds present  EXTREMITIES:  2+ Peripheral Pulses, No clubbing, cyanosis, or edema  NEUROLOGY: non-focal  SKIN: No rashes or lesions    LABS:        repeat  TSH <.01 , free T4: WNL on  Monday - .            EKG(personally reviewed):    RADIOLOGY & ADDITIONAL TESTS:    Imaging Personally Reviewed:    Consultant(s) Notes Reviewed:      Care Discussed with Consultants/Other Providers:

## 2020-07-13 NOTE — CONSULT NOTE ADULT - ASSESSMENT
55 year old female with a PMHx of Hyperlipidemia, Hypothyroidism presents to [facility] with an admitting diagnosis of Severe episode of recurrent major depressive disorder, without psychotic features. Medicine consulted for hypothyroid management.      1. Severe episode of recurrent major depressive disorder, without psychotic features- follow plan as per primary team  2. Hyperlipidemia- continue Lipitor 80mg QHS    #Hypothyroidism:  h/o hypothyroidism due to Hashimoto thyroiditis on Synthroid 175mcg daily, now has  TSH<0.01, FT4 2.34. Per Dr. Hameed D/W Dr Forrest, Endo attending, held Synthroid over weekend: repeat  TSH <.01 , free T4: WNL on  Monday - 7/13.  - Will  reduce dosage of synthroid based on weight. 55 year old female with a PMHx of Hyperlipidemia, Hypothyroidism presents to [facility] with an admitting diagnosis of Severe episode of recurrent major depressive disorder, without psychotic features. Medicine consulted for hypothyroid management.      1. Severe episode of recurrent major depressive disorder, without psychotic features- follow plan as per primary team  2. Hyperlipidemia- continue Lipitor 80mg QHS    #Hypothyroidism:  h/o hypothyroidism due to Hashimoto thyroiditis on Synthroid 175mcg daily, now has  TSH<0.01, FT4 2.34. Per Dr. Hameed D/W Dr Forrest, Endo attending, held Synthroid over weekend: repeat  TSH <.01 , free T4: WNL on  Monday - 7/13.  - Will  reduce dosage of synthroid based on weight. - 1.6mcg/kg - awaiting patient's weight for dose adjustment

## 2020-07-14 PROCEDURE — 99232 SBSQ HOSP IP/OBS MODERATE 35: CPT

## 2020-07-14 PROCEDURE — 90832 PSYTX W PT 30 MINUTES: CPT

## 2020-07-14 RX ADMIN — Medication 75 MICROGRAM(S): at 09:49

## 2020-07-14 RX ADMIN — OLANZAPINE 7.5 MILLIGRAM(S): 15 TABLET, FILM COATED ORAL at 20:49

## 2020-07-14 RX ADMIN — Medication 325 MILLIGRAM(S): at 09:49

## 2020-07-14 RX ADMIN — ATORVASTATIN CALCIUM 80 MILLIGRAM(S): 80 TABLET, FILM COATED ORAL at 20:49

## 2020-07-15 LAB — SARS-COV-2 RNA SPEC QL NAA+PROBE: SIGNIFICANT CHANGE UP

## 2020-07-15 PROCEDURE — 90870 ELECTROCONVULSIVE THERAPY: CPT

## 2020-07-15 PROCEDURE — 99232 SBSQ HOSP IP/OBS MODERATE 35: CPT | Mod: 25

## 2020-07-15 RX ADMIN — Medication 75 MICROGRAM(S): at 08:25

## 2020-07-15 RX ADMIN — Medication 325 MILLIGRAM(S): at 08:25

## 2020-07-15 RX ADMIN — OLANZAPINE 7.5 MILLIGRAM(S): 15 TABLET, FILM COATED ORAL at 20:46

## 2020-07-15 RX ADMIN — ATORVASTATIN CALCIUM 80 MILLIGRAM(S): 80 TABLET, FILM COATED ORAL at 20:46

## 2020-07-16 PROCEDURE — 90832 PSYTX W PT 30 MINUTES: CPT

## 2020-07-16 PROCEDURE — 99232 SBSQ HOSP IP/OBS MODERATE 35: CPT

## 2020-07-16 RX ADMIN — Medication 325 MILLIGRAM(S): at 09:09

## 2020-07-16 RX ADMIN — ATORVASTATIN CALCIUM 80 MILLIGRAM(S): 80 TABLET, FILM COATED ORAL at 20:45

## 2020-07-16 RX ADMIN — Medication 75 MICROGRAM(S): at 09:09

## 2020-07-16 RX ADMIN — OLANZAPINE 7.5 MILLIGRAM(S): 15 TABLET, FILM COATED ORAL at 20:45

## 2020-07-17 LAB
HCG UR-SCNC: NEGATIVE — SIGNIFICANT CHANGE UP
SP GR UR: 1.01 — SIGNIFICANT CHANGE UP (ref 1–1.04)

## 2020-07-17 PROCEDURE — 90870 ELECTROCONVULSIVE THERAPY: CPT

## 2020-07-17 RX ADMIN — Medication 75 MICROGRAM(S): at 08:59

## 2020-07-17 RX ADMIN — ATORVASTATIN CALCIUM 80 MILLIGRAM(S): 80 TABLET, FILM COATED ORAL at 20:34

## 2020-07-17 RX ADMIN — Medication 325 MILLIGRAM(S): at 08:59

## 2020-07-17 RX ADMIN — OLANZAPINE 7.5 MILLIGRAM(S): 15 TABLET, FILM COATED ORAL at 20:34

## 2020-07-18 RX ADMIN — Medication 325 MILLIGRAM(S): at 09:03

## 2020-07-18 RX ADMIN — ATORVASTATIN CALCIUM 80 MILLIGRAM(S): 80 TABLET, FILM COATED ORAL at 20:28

## 2020-07-18 RX ADMIN — OLANZAPINE 7.5 MILLIGRAM(S): 15 TABLET, FILM COATED ORAL at 20:28

## 2020-07-18 RX ADMIN — Medication 75 MICROGRAM(S): at 09:03

## 2020-07-19 RX ADMIN — Medication 325 MILLIGRAM(S): at 08:47

## 2020-07-19 RX ADMIN — OLANZAPINE 7.5 MILLIGRAM(S): 15 TABLET, FILM COATED ORAL at 20:30

## 2020-07-19 RX ADMIN — ATORVASTATIN CALCIUM 80 MILLIGRAM(S): 80 TABLET, FILM COATED ORAL at 20:30

## 2020-07-19 RX ADMIN — Medication 75 MICROGRAM(S): at 08:47

## 2020-07-20 PROCEDURE — 90870 ELECTROCONVULSIVE THERAPY: CPT

## 2020-07-20 RX ORDER — OLANZAPINE 15 MG/1
5 TABLET, FILM COATED ORAL AT BEDTIME
Refills: 0 | Status: DISCONTINUED | OUTPATIENT
Start: 2020-07-20 | End: 2020-07-24

## 2020-07-20 RX ADMIN — Medication 75 MICROGRAM(S): at 08:52

## 2020-07-20 RX ADMIN — ATORVASTATIN CALCIUM 80 MILLIGRAM(S): 80 TABLET, FILM COATED ORAL at 20:24

## 2020-07-20 RX ADMIN — OLANZAPINE 5 MILLIGRAM(S): 15 TABLET, FILM COATED ORAL at 20:24

## 2020-07-20 RX ADMIN — Medication 325 MILLIGRAM(S): at 08:53

## 2020-07-21 LAB
HCG UR-SCNC: NEGATIVE — SIGNIFICANT CHANGE UP
SARS-COV-2 RNA SPEC QL NAA+PROBE: SIGNIFICANT CHANGE UP
SP GR UR: 1.01 — SIGNIFICANT CHANGE UP (ref 1–1.04)

## 2020-07-21 PROCEDURE — 90832 PSYTX W PT 30 MINUTES: CPT

## 2020-07-21 PROCEDURE — 99231 SBSQ HOSP IP/OBS SF/LOW 25: CPT

## 2020-07-21 RX ADMIN — ATORVASTATIN CALCIUM 80 MILLIGRAM(S): 80 TABLET, FILM COATED ORAL at 21:03

## 2020-07-21 RX ADMIN — Medication 325 MILLIGRAM(S): at 09:16

## 2020-07-21 RX ADMIN — OLANZAPINE 5 MILLIGRAM(S): 15 TABLET, FILM COATED ORAL at 21:03

## 2020-07-21 RX ADMIN — Medication 75 MICROGRAM(S): at 09:16

## 2020-07-22 PROCEDURE — 90870 ELECTROCONVULSIVE THERAPY: CPT

## 2020-07-22 RX ADMIN — ATORVASTATIN CALCIUM 80 MILLIGRAM(S): 80 TABLET, FILM COATED ORAL at 20:39

## 2020-07-22 RX ADMIN — OLANZAPINE 5 MILLIGRAM(S): 15 TABLET, FILM COATED ORAL at 20:38

## 2020-07-22 RX ADMIN — Medication 75 MICROGRAM(S): at 08:48

## 2020-07-22 RX ADMIN — Medication 325 MILLIGRAM(S): at 08:48

## 2020-07-23 PROCEDURE — 90832 PSYTX W PT 30 MINUTES: CPT

## 2020-07-23 PROCEDURE — 99231 SBSQ HOSP IP/OBS SF/LOW 25: CPT

## 2020-07-23 RX ORDER — ZOLPIDEM TARTRATE 10 MG/1
1 TABLET ORAL
Qty: 0 | Refills: 0 | DISCHARGE

## 2020-07-23 RX ORDER — PROGESTERONE 200 MG/1
0 CAPSULE, LIQUID FILLED ORAL
Qty: 0 | Refills: 0 | DISCHARGE

## 2020-07-23 RX ORDER — LEVOTHYROXINE SODIUM 125 MCG
1 TABLET ORAL
Qty: 0 | Refills: 0 | DISCHARGE

## 2020-07-23 RX ORDER — CHOLECALCIFEROL (VITAMIN D3) 125 MCG
0 CAPSULE ORAL
Qty: 0 | Refills: 0 | DISCHARGE

## 2020-07-23 RX ORDER — OLANZAPINE 15 MG/1
1 TABLET, FILM COATED ORAL
Qty: 14 | Refills: 0
Start: 2020-07-23 | End: 2020-08-05

## 2020-07-23 RX ORDER — ATORVASTATIN CALCIUM 80 MG/1
1 TABLET, FILM COATED ORAL
Qty: 30 | Refills: 0
Start: 2020-07-23 | End: 2020-08-21

## 2020-07-23 RX ORDER — SERTRALINE 25 MG/1
1 TABLET, FILM COATED ORAL
Qty: 14 | Refills: 0
Start: 2020-07-23 | End: 2020-08-05

## 2020-07-23 RX ORDER — FERROUS SULFATE 325(65) MG
0 TABLET ORAL
Qty: 0 | Refills: 0 | DISCHARGE

## 2020-07-23 RX ORDER — LEVOTHYROXINE SODIUM 125 MCG
1 TABLET ORAL
Qty: 30 | Refills: 0
Start: 2020-07-23 | End: 2020-08-21

## 2020-07-23 RX ORDER — ATORVASTATIN CALCIUM 80 MG/1
1 TABLET, FILM COATED ORAL
Qty: 0 | Refills: 0 | DISCHARGE

## 2020-07-23 RX ADMIN — ATORVASTATIN CALCIUM 80 MILLIGRAM(S): 80 TABLET, FILM COATED ORAL at 20:26

## 2020-07-23 RX ADMIN — Medication 325 MILLIGRAM(S): at 08:33

## 2020-07-23 RX ADMIN — OLANZAPINE 5 MILLIGRAM(S): 15 TABLET, FILM COATED ORAL at 20:26

## 2020-07-23 RX ADMIN — Medication 75 MICROGRAM(S): at 06:34

## 2020-07-24 VITALS — TEMPERATURE: 98 F | SYSTOLIC BLOOD PRESSURE: 120 MMHG | DIASTOLIC BLOOD PRESSURE: 83 MMHG | HEART RATE: 81 BPM

## 2020-07-24 LAB — SARS-COV-2 RNA SPEC QL NAA+PROBE: SIGNIFICANT CHANGE UP

## 2020-07-24 PROCEDURE — 90870 ELECTROCONVULSIVE THERAPY: CPT

## 2020-07-24 PROCEDURE — 99238 HOSP IP/OBS DSCHRG MGMT 30/<: CPT | Mod: 25

## 2020-07-24 RX ADMIN — Medication 75 MICROGRAM(S): at 08:25

## 2020-07-24 RX ADMIN — SERTRALINE 125 MILLIGRAM(S): 25 TABLET, FILM COATED ORAL at 08:25

## 2020-07-24 RX ADMIN — Medication 325 MILLIGRAM(S): at 08:25

## 2020-07-27 ENCOUNTER — OUTPATIENT (OUTPATIENT)
Dept: OUTPATIENT SERVICES | Facility: HOSPITAL | Age: 55
LOS: 1 days | Discharge: HOME | End: 2020-07-27
Payer: COMMERCIAL

## 2020-07-27 ENCOUNTER — OUTPATIENT (OUTPATIENT)
Dept: OUTPATIENT SERVICES | Facility: HOSPITAL | Age: 55
LOS: 1 days | Discharge: HOME | End: 2020-07-27

## 2020-07-27 DIAGNOSIS — J34.2 DEVIATED NASAL SEPTUM: Chronic | ICD-10-CM

## 2020-07-27 DIAGNOSIS — N60.02 SOLITARY CYST OF LEFT BREAST: Chronic | ICD-10-CM

## 2020-07-27 DIAGNOSIS — Z98.890 OTHER SPECIFIED POSTPROCEDURAL STATES: Chronic | ICD-10-CM

## 2020-07-28 DIAGNOSIS — F32.3 MAJOR DEPRESSIVE DISORDER, SINGLE EPISODE, SEVERE WITH PSYCHOTIC FEATURES: ICD-10-CM

## 2020-07-30 PROCEDURE — 90870 ELECTROCONVULSIVE THERAPY: CPT

## 2020-07-31 LAB — SARS-COV-2 RNA SPEC QL NAA+PROBE: SIGNIFICANT CHANGE UP

## 2020-08-06 PROCEDURE — 90870 ELECTROCONVULSIVE THERAPY: CPT

## 2020-08-07 LAB — SARS-COV-2 RNA SPEC QL NAA+PROBE: SIGNIFICANT CHANGE UP

## 2020-08-13 PROCEDURE — 90870 ELECTROCONVULSIVE THERAPY: CPT

## 2020-08-25 LAB — SARS-COV-2 RNA SPEC QL NAA+PROBE: SIGNIFICANT CHANGE UP

## 2020-08-27 PROCEDURE — 90870 ELECTROCONVULSIVE THERAPY: CPT

## 2020-08-31 ENCOUNTER — OUTPATIENT (OUTPATIENT)
Dept: OUTPATIENT SERVICES | Facility: HOSPITAL | Age: 55
LOS: 1 days | Discharge: PSYCHIATRIC OR SUBSTANCE USE FACILITY | End: 2020-08-31

## 2020-08-31 DIAGNOSIS — J34.2 DEVIATED NASAL SEPTUM: Chronic | ICD-10-CM

## 2020-08-31 DIAGNOSIS — Z98.890 OTHER SPECIFIED POSTPROCEDURAL STATES: Chronic | ICD-10-CM

## 2020-08-31 DIAGNOSIS — N60.02 SOLITARY CYST OF LEFT BREAST: Chronic | ICD-10-CM

## 2020-09-15 LAB — SARS-COV-2 RNA SPEC QL NAA+PROBE: SIGNIFICANT CHANGE UP

## 2020-09-17 PROCEDURE — 90870 ELECTROCONVULSIVE THERAPY: CPT

## 2020-10-09 DIAGNOSIS — F32.3 MAJOR DEPRESSIVE DISORDER, SINGLE EPISODE, SEVERE WITH PSYCHOTIC FEATURES: ICD-10-CM

## 2020-10-14 LAB — SARS-COV-2 RNA SPEC QL NAA+PROBE: SIGNIFICANT CHANGE UP

## 2020-10-16 PROCEDURE — 90870 ELECTROCONVULSIVE THERAPY: CPT

## 2020-11-10 LAB — SARS-COV-2 RNA SPEC QL NAA+PROBE: SIGNIFICANT CHANGE UP

## 2020-11-12 PROCEDURE — 90870 ELECTROCONVULSIVE THERAPY: CPT

## 2020-12-08 LAB — SARS-COV-2 RNA SPEC QL NAA+PROBE: SIGNIFICANT CHANGE UP

## 2020-12-10 VITALS
WEIGHT: 121.92 LBS | DIASTOLIC BLOOD PRESSURE: 79 MMHG | OXYGEN SATURATION: 96 % | HEART RATE: 67 BPM | HEIGHT: 60 IN | TEMPERATURE: 98 F | SYSTOLIC BLOOD PRESSURE: 116 MMHG | RESPIRATION RATE: 13 BRPM

## 2020-12-10 PROCEDURE — 90870 ELECTROCONVULSIVE THERAPY: CPT

## 2020-12-18 ENCOUNTER — OUTPATIENT (OUTPATIENT)
Dept: OUTPATIENT SERVICES | Facility: HOSPITAL | Age: 55
LOS: 1 days | Discharge: PSYCHIATRIC OR SUBSTANCE USE FACILITY | End: 2020-12-18
Payer: MEDICAID

## 2020-12-18 DIAGNOSIS — J34.2 DEVIATED NASAL SEPTUM: Chronic | ICD-10-CM

## 2020-12-18 DIAGNOSIS — Z98.890 OTHER SPECIFIED POSTPROCEDURAL STATES: Chronic | ICD-10-CM

## 2020-12-18 DIAGNOSIS — N60.02 SOLITARY CYST OF LEFT BREAST: Chronic | ICD-10-CM

## 2021-01-05 LAB — SARS-COV-2 RNA SPEC QL NAA+PROBE: SIGNIFICANT CHANGE UP

## 2021-01-07 PROCEDURE — 90870 ELECTROCONVULSIVE THERAPY: CPT

## 2021-01-29 PROCEDURE — 99214 OFFICE O/P EST MOD 30 MIN: CPT | Mod: 95

## 2021-02-02 LAB — SARS-COV-2 RNA SPEC QL NAA+PROBE: SIGNIFICANT CHANGE UP

## 2021-02-04 PROCEDURE — 90870 ELECTROCONVULSIVE THERAPY: CPT

## 2021-02-04 NOTE — ECT TREATMENT NOTE - NSECTFOCPEVITALS_PSY_ALL_CORE
Vital Signs Last 24 Hrs  T(C): 36.7 (07 Jan 2021 13:30), Max: 36.7 (07 Jan 2021 13:30)  T(F): 98.1 (07 Jan 2021 13:30), Max: 98.1 (07 Jan 2021 13:30)  HR: 73 (07 Jan 2021 13:30) (73 - 73)  BP: 118/91 (07 Jan 2021 13:30) (118/91 - 118/91)  BP(mean): --  RR: 15 (07 Jan 2021 13:30) (15 - 15)  SpO2: 98% (07 Jan 2021 13:30) (98% - 98%)
Vital Signs Last 24 Hrs  T(C): --  T(F): --  HR: --  BP: --  BP(mean): --  RR: --  SpO2: --
Vital Signs Last 24 Hrs  T(C): 36.7 (04 Feb 2021 13:45), Max: 36.7 (04 Feb 2021 13:45)  T(F): 98.1 (04 Feb 2021 13:45), Max: 98.1 (04 Feb 2021 13:45)  HR: 74 (04 Feb 2021 13:45) (74 - 74)  BP: 160/85 (04 Feb 2021 13:45) (160/85 - 160/85)  BP(mean): --  RR: 18 (04 Feb 2021 13:45) (18 - 18)  SpO2: 98% (04 Feb 2021 13:45) (98% - 98%)

## 2021-02-11 DIAGNOSIS — F32.3 MAJOR DEPRESSIVE DISORDER, SINGLE EPISODE, SEVERE WITH PSYCHOTIC FEATURES: ICD-10-CM

## 2021-02-26 PROCEDURE — 99214 OFFICE O/P EST MOD 30 MIN: CPT | Mod: 95

## 2021-03-03 LAB — SARS-COV-2 RNA SPEC QL NAA+PROBE: SIGNIFICANT CHANGE UP

## 2021-03-04 PROCEDURE — 90870 ELECTROCONVULSIVE THERAPY: CPT

## 2021-03-04 NOTE — ECT AMBULATORY DISCHARGE PLAN - NSPOSTDCOFCARE_PSY_ALL_CORE
You will receive a follow-up phone call from a nurse by the next business day after your treatment to ask how you are feeling.

## 2021-03-26 PROCEDURE — 99214 OFFICE O/P EST MOD 30 MIN: CPT | Mod: 95

## 2021-03-30 LAB — SARS-COV-2 RNA SPEC QL NAA+PROBE: SIGNIFICANT CHANGE UP

## 2021-04-01 PROCEDURE — 90870 ELECTROCONVULSIVE THERAPY: CPT

## 2021-04-23 PROCEDURE — 99214 OFFICE O/P EST MOD 30 MIN: CPT | Mod: 95

## 2021-04-27 LAB — SARS-COV-2 RNA SPEC QL NAA+PROBE: SIGNIFICANT CHANGE UP

## 2021-04-29 PROCEDURE — 90870 ELECTROCONVULSIVE THERAPY: CPT

## 2021-05-06 DIAGNOSIS — F33.9 MAJOR DEPRESSIVE DISORDER, RECURRENT, UNSPECIFIED: ICD-10-CM

## 2021-05-26 LAB — SARS-COV-2 RNA SPEC QL NAA+PROBE: SIGNIFICANT CHANGE UP

## 2021-05-27 PROCEDURE — 90870 ELECTROCONVULSIVE THERAPY: CPT

## 2021-05-27 NOTE — ECT PRE-PROCEDURE CHECKLIST - NSECTCONSENT_PSY_ALL_CORE
ECT BF Maintenance Consent from 8/6/20  Anesthesia exp.  6/4/21/yes ECT BF Maintenance Consent from 8/6/20  Anesthesia exp.  8/27/21/yes

## 2021-06-04 PROCEDURE — 99214 OFFICE O/P EST MOD 30 MIN: CPT | Mod: 95

## 2021-06-07 NOTE — H&P PST ADULT - FUNCTIONAL LEVEL PRIOR: DRESSING
[de-identified] : 55y/o female with right knee periprosthetic infection\par - Symptoms have been sharply progressive since March with the accompanying cellulitis history. Therefore, I believe this infection is about three months old (chronic)\par - She will need a two stage septic revision for the right side. She is already familiar, unfortunately, with what this procedure entails. 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
0 = independent

## 2021-06-23 LAB — SARS-COV-2 RNA SPEC QL NAA+PROBE: SIGNIFICANT CHANGE UP

## 2021-06-24 PROCEDURE — 90870 ELECTROCONVULSIVE THERAPY: CPT

## 2021-07-02 PROCEDURE — 99214 OFFICE O/P EST MOD 30 MIN: CPT | Mod: 95

## 2021-07-20 LAB — SARS-COV-2 RNA SPEC QL NAA+PROBE: SIGNIFICANT CHANGE UP

## 2021-07-22 PROCEDURE — 90870 ELECTROCONVULSIVE THERAPY: CPT

## 2021-07-30 PROCEDURE — 99214 OFFICE O/P EST MOD 30 MIN: CPT | Mod: 95

## 2021-08-17 LAB — SARS-COV-2 RNA SPEC QL NAA+PROBE: SIGNIFICANT CHANGE UP

## 2021-08-19 PROCEDURE — 90870 ELECTROCONVULSIVE THERAPY: CPT

## 2021-08-27 PROCEDURE — 99214 OFFICE O/P EST MOD 30 MIN: CPT | Mod: 95

## 2021-09-14 LAB — SARS-COV-2 RNA SPEC QL NAA+PROBE: SIGNIFICANT CHANGE UP

## 2021-09-17 PROCEDURE — 90870 ELECTROCONVULSIVE THERAPY: CPT

## 2021-09-17 NOTE — ECT TREATMENT NOTE - NSCGISEVERILLNESS_PSY_ALL_CORE
1 = Normal – not at all ill, symptoms of disorder not present past seven days

## 2021-09-17 NOTE — ECT TREATMENT NOTE - NSCGIIMPROVESX_PSY_ALL_CORE
1 - Very much improved - nearly all better; good level of functioning; minimal symptoms; represents a very substantial change

## 2021-09-24 PROCEDURE — 99214 OFFICE O/P EST MOD 30 MIN: CPT | Mod: 95

## 2021-10-13 LAB — SARS-COV-2 RNA SPEC QL NAA+PROBE: SIGNIFICANT CHANGE UP

## 2021-10-14 PROCEDURE — 90870 ELECTROCONVULSIVE THERAPY: CPT

## 2021-11-10 LAB — SARS-COV-2 RNA SPEC QL NAA+PROBE: SIGNIFICANT CHANGE UP

## 2021-11-11 PROCEDURE — 90870 ELECTROCONVULSIVE THERAPY: CPT

## 2021-12-08 LAB — SARS-COV-2 RNA SPEC QL NAA+PROBE: SIGNIFICANT CHANGE UP

## 2021-12-09 VITALS
SYSTOLIC BLOOD PRESSURE: 108 MMHG | OXYGEN SATURATION: 98 % | RESPIRATION RATE: 18 BRPM | HEART RATE: 80 BPM | DIASTOLIC BLOOD PRESSURE: 57 MMHG

## 2021-12-09 PROCEDURE — 90870 ELECTROCONVULSIVE THERAPY: CPT

## 2021-12-09 NOTE — ECT TREATMENT NOTE - NSECTNEUROCOMMENT_PSY_ALL_CORE
H/O TMJ

## 2021-12-09 NOTE — ECT TREATMENT NOTE - NSECTROSNEGAT_PSY_ALL_CORE
Review of Systems negative/unchanged from previous exam except as noted below

## 2021-12-09 NOTE — ECT AMBULATORY DISCHARGE PLAN - NSPOSTECTSMOKING_PSY_ALL_CORE
If you are a smoker, it is important for your health to stop smoking.  Please be aware that second hand smoke is also harmful.

## 2021-12-09 NOTE — ECT PRE-PROCEDURE CHECKLIST - AS BP NONINV SITE
left upper arm
right upper arm
left upper arm

## 2021-12-09 NOTE — ECT AMBULATORY DISCHARGE PLAN - NSPROCPERF_PSY_ALL_CORE
Electroconvulsive Therapy (ECT)

## 2021-12-09 NOTE — ECT TREATMENT NOTE - NSICDXBHPRIMARYDX_PSY_ALL_CORE
Recurrent major depression-severe   F33.2  

## 2021-12-09 NOTE — ECT TREATMENT NOTE - NSECTTXELECPLACE_PSY_ALL_CORE
Bifrontal

## 2021-12-09 NOTE — ECT AMBULATORY DISCHARGE PLAN - NSDCPEFALRISK_GEN_ALL_CORE
Patient information on fall and injury prevention
Patient information on fall and injury prevention
For information on Fall & injury Prevention, visit https://www.[facility]/news/fall-prevention-tips-to-avoid-injury
Patient information on fall and injury prevention
Patient information on fall and injury prevention
For information on Fall & injury Prevention, visit https://www.[facility]/news/fall-prevention-tips-to-avoid-injury
For information on Fall & injury Prevention, visit https://www.[facility]/news/fall-prevention-tips-to-avoid-injury
Patient information on fall and injury prevention
For information on Fall & Injury Prevention, visit: https://www.[facility]/news/fall-prevention-protects-and-maintains-health-and-mobility OR  https://www.[facility]/news/fall-prevention-tips-to-avoid-injury OR  https://www.cdc.gov/steadi/patient.html
Patient information on fall and injury prevention
For information on Fall & injury Prevention, visit https://www.[facility]/news/fall-prevention-tips-to-avoid-injury
Patient information on fall and injury prevention

## 2021-12-09 NOTE — ECT TREATMENT NOTE - NSECTTHYPULSE1ST_PSY_ALL_CORE
1 ms (DGX)

## 2021-12-09 NOTE — ECT PRE-PROCEDURE CHECKLIST - NSPROPTRIGHTBILLOFRIGHTS_GEN_A_NUR
patient

## 2021-12-09 NOTE — ECT PRE-PROCEDURE CHECKLIST - CAREGIVER NAME
German Nelson

## 2021-12-09 NOTE — ECT TREATMENT NOTE - NSECTCPTCODE_PSY_ALL_CORE
09788 (ECT-Electroconvulsive Therapy)
09878 (ECT-Electroconvulsive Therapy)
68216 (ECT-Electroconvulsive Therapy)
93799 (ECT-Electroconvulsive Therapy)
57388 (ECT-Electroconvulsive Therapy)
67780 (ECT-Electroconvulsive Therapy)
32141 (ECT-Electroconvulsive Therapy)
17225 (ECT-Electroconvulsive Therapy)
74855 (ECT-Electroconvulsive Therapy)
58333 (ECT-Electroconvulsive Therapy)
11538 (ECT-Electroconvulsive Therapy)
71570 (ECT-Electroconvulsive Therapy)
52840 (ECT-Electroconvulsive Therapy)
31767 (ECT-Electroconvulsive Therapy)

## 2021-12-09 NOTE — ECT AMBULATORY DISCHARGE PLAN - NSPOSTECTDIET_PSY_ALL_CORE
Gradually resume your regular diet/Increase fluids
Gradually resume your regular diet/Increase fluids
Increase fluids/Gradually resume your regular diet
Gradually resume your regular diet/Increase fluids

## 2021-12-09 NOTE — ECT PRE-PROCEDURE CHECKLIST - NS PREOP CHK TEST_COVID_DT_GEN_ALL_CORE
20-Jul-2021
02-Mar-2021
09-Nov-2021
22-Jun-2021
25-May-2021 13:12
27-Apr-2021
07-Dec-2021
12-Oct-2021
14-Sep-2021
17-Aug-2021

## 2021-12-09 NOTE — ECT PRE-PROCEDURE CHECKLIST - NSPTSENTTO_PSY_ALL_CORE
procedural room

## 2021-12-09 NOTE — ECT AMBULATORY DISCHARGE PLAN - PATIENT PORTAL LINK FT
You can access the FollowMyHealth Patient Portal offered by [facility] by registering at the following website: [email or web address]. By joining [employer]’s FollowMyHealth portal, you will also be able to view your health information using other applications (apps) compatible with our system.
You can access the FollowMyHealth Patient Portal offered by [facility] by registering at the following website: [email or web address]. By joining [employer]’s FollowMyHealth portal, you will also be able to view your health information using other applications (apps) compatible with our system.
You can access the FollowMyHealth Patient Portal offered by [facility] by registering at the following website: [email or web address]. By joining [employer]’s FollowMyHealth portal, you will also be able to view your health information using other applications (apps) compatible with our system.
You can access the FollowMyHealth Patient Portal offered by [facility] by registering at the following website: [email or web address]. By joining [employer]’s FollowMyHealth portal, you will also be able to view your health information using other applications (apps) compatible with our system.
You can access the FollowMyHealth Patient Portal offered by [facility] by registering at the following website: [email or web address]. By joining [employer]’s FollowMyHealth portal, you will also be able to view your health information using other applications (apps) compatible with our system.
You can access the FollowMyHealth Patient Portal offered by [facility] by registering at the following website: [email or web address]. By joining [employer]’s FollowMyHealth portal, you will also be able to view your health information using other applications (apps) compatible with our system.
You can access the FollowMyHealth Patient Portal offered by [facility] by registering at the following website: [email or web address]. By joining [employer]’s FollowMyHealth portal, you will also be able to view your health information using other applications (apps) compatible with our system.
You can access the FollowMyHealth Patient Portal offered by [facility] by registering at the following website: [email or web address]. By joining [employer]’s FollowMyHealth portal, you will also be able to view your health information using other applications (apps) compatible with our system.
You can access the FollowMyHealth Patient Portal offered by [facility] by registering at the following website: [email or web address]. By joining [employer]’s FollowMyHealth portal, you will also be able to view your health information using other applications (apps) compatible with our system.
You can access the FollowMyHealth Patient Portal offered by [facility] by registering at the following website: [email or web address]. By joining [employer]’s FollowMyHealth portal, you will also be able to view your health information using other applications (apps) compatible with our system.
You can access the FollowMyHealth Patient Portal offered by [facility] by registering at the following website: [email or web address]. By joining [employer]’s FollowMyHealth portal, you will also be able to view your health information using other applications (apps) compatible with our system.
You can access the FollowMyHealth Patient Portal offered by [facility] by registering at the following website: [email or web address]. By joining [employer]’s FollowMyHealth portal, you will also be able to view your health information using other applications (apps) compatible with our system.
You can access the FollowMyHealth Patient Portal offered by [facility] by registering at the following website: [email or web address]. By joining [employer]’s FollowMyHealth portal, you will also be able to view your health information using other applications (apps) compatible with our system.
You can access the FollowMyHealth Patient Portal offered by [facility] by registering at the following website: [email or web address]. By joining [employer]’s FollowMyHealth portal, you will also be able to view your health information using other applications (apps) compatible with our system.

## 2021-12-09 NOTE — ECT TREATMENT NOTE - NSECTFOCPECOMPLET_PSY_ALL_CORE
Focused Physical Exam Completed

## 2021-12-09 NOTE — ECT PRE-PROCEDURE CHECKLIST - NSBHSUPPORTNAME_PSY_ALL_CORE
same as above
mother Evie Guadalupe
same as above

## 2021-12-09 NOTE — ECT PRE-PROCEDURE CHECKLIST - ALLERGIES
Allergies:-  sulfa drugs  Levaquin      

## 2021-12-09 NOTE — ECT PRE-PROCEDURE CHECKLIST - TO WHOM
procedure room rn 
Procedure Room RN
TX room RN
procedure room rn - self
procedure room rn 
JOSE Reyna
ERICA Murdock RN
procedure room rn 
procedure room rn - self
Procedure Room RN
Mirian Cabrera

## 2021-12-09 NOTE — ECT PRE-PROCEDURE CHECKLIST - CAREGIVER ADDRESS
[facility]

## 2021-12-09 NOTE — ECT TREATMENT NOTE - NSECTCHANGEFREQ_PSY_ALL_CORE
No change

## 2021-12-09 NOTE — ECT PRE-PROCEDURE CHECKLIST - AGENT'S NAME
German Nelson

## 2021-12-09 NOTE — ECT AMBULATORY DISCHARGE PLAN - NSDPACMPNY_GEN_ALL_CORE
Parents
Caregiver
Family
Parents
Family
Parents
Parents

## 2021-12-09 NOTE — ECT PRE-PROCEDURE CHECKLIST - NSADULTACCOMPNAME_PSY_ALL_CORE
Evie Guadalupe

## 2021-12-09 NOTE — ECT PRE-PROCEDURE CHECKLIST - NSMEDSDOSETAKEN_PSY_ALL_CORE
Levoxin .75mg Setraline 100 mg  Setraline 25 mg all po meds at 0800
Levoxine .150mg Sertraline 100 mg  Sertraline 25 mg  Iron 325 mg Vitamin D 2000 - all po meds at 0430
Levoxine .150mg Sertraline 100 mg  Sertraline 25 mg  Iron 325 mg Vitamin D 2000 - all po meds at 0430
Levoxin .150mg Setraline 100 mg  Setraline 25 mg  Iron 325 mg Vitamin D 2000 - all po meds at 0430
Levoxin .75mg Setraline 100 mg  Setraline 25 mg all po meds at 0800
Levoxin .75mg Setraline 100 mg  Setraline 25 mg  Iron 325 mg - all po meds at 0730  Penicillin vk 500 mg at 0700
Levoxin .75mg Setraline 100 mg  Setraline 25 mg  Iron 325 mg Vitamin D 2000 - all po meds at 0730
Levoxin .75mg Setraline 100 mg  Setraline 25 mg  Iron 325 mg - all po meds at 0730  Penicillin vk 500 mg at 0700
Sertraline 125mg, Levothyroxine 0.75 mcg, Iron 65mg
Levoxine .150mg Sertraline 100 mg  Sertraline 25 mg  Iron 325 mg Vitamin D 2000 - all po meds at 0430
Levoxin .150mg Setraline 100 mg  Setraline 25 mg  Iron 325 mg Vitamin D 2000 - all po meds at 0430
Levoxin .75mg Setraline 100 mg  Setraline 25 mg  Iron 325 mg Vitamin D 2000 - all po meds at 0730
Levoxin .75mg Setraline 100 mg  Setraline 25 mg  Iron 325 mg - all po meds at 0730
Levoxin .75mg Setraline 100 mg  Setraline 25 mg  Iron 325 mg - all po meds at 0730  Penicillin vk 500 mg at 0700

## 2021-12-09 NOTE — ECT PRE-PROCEDURE CHECKLIST - AS BP NONINV METHOD
electronic

## 2021-12-09 NOTE — ECT AMBULATORY DISCHARGE PLAN - NURSING SECTION COMPLETE
Patient/Caregiver provided printed discharge information.

## 2021-12-09 NOTE — ECT TREATMENT NOTE - NSECTMACHPARA1ST_PSY_ALL_CORE
Thymatron

## 2021-12-09 NOTE — ECT PRE-PROCEDURE CHECKLIST - NSDISPOFBELONG_PSY_ALL_CORE
not applicable
remains with family
remains with patient
not applicable

## 2021-12-09 NOTE — ECT PRE-PROCEDURE CHECKLIST - NSADULTACCOMPPHNUMBER_PSY_ALL_CORE
[phone]
[phone]
[phone]
[phone]
[phone]
[phone]
[phone]
[phone]
[phone]
[phone]
[phone]
[phone]
[phone]
[phone]

## 2021-12-09 NOTE — ECT PRE-PROCEDURE CHECKLIST - PERIPHERAL IV: INSERTION DATE
17-Sep-2021
22-Jul-2021
14-Oct-2021
10-Dec-2020
I  have   abdominal   pain
19-Aug-2021
11-Nov-2021
07-Jan-2021
09-Dec-2021
04-Mar-2021
04-Feb-2021
24-Jun-2021
01-Apr-2021
27-May-2021

## 2021-12-09 NOTE — ECT AMBULATORY DISCHARGE PLAN - NSDCPNDISPN_GEN_ALL_CORE
Education provided on the pain management plan of care

## 2021-12-09 NOTE — ECT AMBULATORY DISCHARGE PLAN - NSPOSTECTPOSSCOMP_PSY_ALL_CORE
Headache, nausea, general body aches are common experiences after this treatment.  These may be treated with over-the-counter pain medication.

## 2021-12-09 NOTE — ECT TREATMENT NOTE - NSECTPOSTTXSUMMARY_PSY_ALL_CORE
The patient had a well modified grand mal seizure under general anesthesia and succinylcholine.  The patient is alert, responsive, in no acute distress.  Recovery uneventful. 
The patient had a well modified grand mal seizure under general anesthesia and muscle relaxant. The patient is alert, responsive, in no acute distress.  Recovery uneventful. 
The patient had a well modified grand mal seizure under general anesthesia and succinylcholine.  The patient is alert, responsive, in no acute distress.  Recovery uneventful.
The patient had a well modified grand mal seizure under general anesthesia and succinylcholine.  The patient is alert, responsive, in no acute distress.  Recovery uneventful. 
The patient had a well modified grand mal seizure under general anesthesia and muscle relaxant. The patient is alert, responsive, in no acute distress.  Recovery uneventful. 
The patient had a well modified grand mal seizure under general anesthesia and succinylcholine.  The patient is alert, responsive, in no acute distress.  Recovery uneventful. 
The patient had a well modified grand mal seizure under general anesthesia and muscle relaxant. The patient is alert, responsive, in no acute distress.  Recovery uneventful. 
The patient had a well modified grand mal seizure under general anesthesia and succinylcholine.  The patient is alert, responsive, in no acute distress.  Recovery uneventful.

## 2021-12-09 NOTE — ECT AMBULATORY DISCHARGE PLAN - NSPOSTECTPROVEDUCFT_PSY_ALL_CORE
COVID TEACHING
Covid 19 Precautions
Covid educational material, Post ECT discharge instructions
Covid 19 Precautions
covid precautions
COVID TEACHING
Covid Education
COVID TEACHING
COVID TEACHING

## 2021-12-09 NOTE — ECT AMBULATORY DISCHARGE PLAN - NSPOSTECTCASEEMER_PSY_ALL_CORE
In case of any emergency, please go to the nearest emergency room

## 2021-12-09 NOTE — ECT PRE-PROCEDURE CHECKLIST - LAST TOOK
clears
clears
solids
sips of water at 07 45/clears
solids
clears
solids
clears
solids
clears
solids

## 2021-12-09 NOTE — ECT AMBULATORY DISCHARGE PLAN - MODE OF TRANSPORTATION
Wheelchair/Stroller

## 2021-12-09 NOTE — ECT AMBULATORY DISCHARGE PLAN - NSPOSTECTCALLBEFORE_PSY_ALL_CORE
[facility] ([facility]) scheduling office at [phone]
[facility] ([facility]) scheduling office at [phone]
[facility] ([facility]) scheduling office at [phone]
[facility] ([facility]) scheduling office at [phone]
[facility] ([facility]) scheduling office at [phone]
[facility] ([facility]) scheduling office at [phone]
[facility] ([facility]) scheduling office at [phone]
[facility] ([facility]) scheduling office at [phone]
[facility] ([facility]) scheduling office at [phone]
[facility] ([facility]) scheduling office at [phone]
[facility] ([facility]) scheduling office at [phone]
[facility] ([facility]) scheduling office at [phone]
[facility] ([facility]) scheduling office at [phone]
[facility] ([facility]) scheduling office at [phone]

## 2021-12-09 NOTE — ECT TREATMENT NOTE - NSSUICPROTFACT_PSY_ALL_CORE
Responsibility to children, family, or others/Identifies reasons for living/Positive therapeutic relationships
Positive therapeutic relationships
Positive therapeutic relationships
Responsibility to children, family, or others/Identifies reasons for living/Positive therapeutic relationships
Positive therapeutic relationships
Responsibility to children, family, or others/Identifies reasons for living/Positive therapeutic relationships
Responsibility to children, family, or others/Identifies reasons for living/Positive therapeutic relationships

## 2021-12-09 NOTE — ECT PRE-PROCEDURE CHECKLIST - PRO INTERPRETER NEED 2
English

## 2021-12-09 NOTE — ECT PRE-PROCEDURE CHECKLIST - NSADULTACCOMPRELATION_PSY_ALL_CORE
parent(s)
significant other
parent(s)
significant other

## 2021-12-09 NOTE — ECT PRE-PROCEDURE CHECKLIST - NSECTNPODT_PSY_ALL_CORE
17-Sep-2021 04:00
11-Nov-2021 05:00
10-Dec-2020 04:00
24-Jun-2021 05:15
29-Apr-2021 00:00
09-Dec-2021 04:30
14-Oct-2021 08:00
19-Aug-2021 04:30
22-Jul-2021 04:45
04-Feb-2021 05:00
04-Mar-2021 04:30
01-Apr-2021 04:00
27-May-2021 05:45
07-Jan-2021 05:30

## 2021-12-09 NOTE — ECT PRE-PROCEDURE CHECKLIST - SELECT TESTS ORDERED
COVID-19
COVID-19
COVID neg 1/6/2021/BMP/CBC/EKG/COVID
COVID-19
Results in MD note/COVID-19
COVID-19
Results in MD note/COVID-19
COVID-19
Results in MD note/COVID-19

## 2021-12-09 NOTE — ECT PRE-PROCEDURE CHECKLIST - INV PERIPH IV DEVICE
Angiocath

## 2021-12-09 NOTE — ECT PRE-PROCEDURE CHECKLIST - NSPTSENTVIA_PSY_ALL_CORE
ambulate
stretcher
ambulate

## 2021-12-09 NOTE — ECT TREATMENT NOTE - NSECTCARDIOCOMMENT_PSY_ALL_CORE
ELIZABETH

## 2021-12-09 NOTE — ECT AMBULATORY DISCHARGE PLAN - NSPOSTECTADDSCHEDAPPTS_PSY_ALL_CORE
COVID TESTING BETWEEN 3-5 PM
Covid 19 Test Appointment 10/12/21 between 4pm-6 pm
COVID TESTING 6/22/2021 BETWEEN 4-6 PM
covid test 12/7/21 between 4-6 pm
COVID TEST 7/20 21 BETWEEN 4-6 PM
COVID TESTING: 3/2/2021 BETWEEN 3-5 PM
COVID TESTING 4/27/2021 BETWEEN 3-5 PM
COVID TEST JANUARY 5, 2021 Between 3pm- 5pm
COVID TEST 5/25/21 BETWEEN 4-6 PM
COVID TESTING 11/9/2021 BETWEEN 4-6 PM
Please come for Covid testing August 17th between 4-6 pm.
COVID TESTING 1/4/2022 BETWEEN 4-6 PM
Covid 19 test appointment 3/30/21 between 3pm-5pm
covid testing between 4pm and 6 pm on 09/14/21

## 2021-12-09 NOTE — ECT PRE-PROCEDURE CHECKLIST - NSPROPTRIGHTNOTIFY_GEN_A_NUR
declines
yes
declines

## 2021-12-09 NOTE — ECT PRE-PROCEDURE CHECKLIST - NSPROEDALEARNPREF_GEN_A_NUR
written material
normal...
written material
verbal instruction

## 2021-12-09 NOTE — ECT PRE-PROCEDURE CHECKLIST - INV PERIPH IV LOCATION
Right:/median cubital vein
Right:/median cubital vein
Right:
Right:/median cubital vein
Right:/metacarpal vein
Right:
metacarpal vein/Right:
Right:/median cubital vein

## 2021-12-09 NOTE — ECT AMBULATORY DISCHARGE PLAN - NSPOSTECTNEXTSCHTXDT_PSY_ALL_CORE
04-Feb-2021 13:30
07-Jan-2021 13:30
24-Jun-2021 13:30
06-Jan-2022 13:30
09-Dec-2021 13:30
29-Apr-2021 13:30
04-Mar-2021 13:30
27-May-2021 13:30
11-Nov-2021 13:30
14-Oct-2021 13:30
19-Aug-2021 13:30
22-Jul-2021 13:30
16-Sep-2021 13:30
04-Mar-2021 13:30

## 2021-12-09 NOTE — ECT PRE-PROCEDURE CHECKLIST - NS PRO AD PATIENT TYPE
Health Care Proxy (HCP)

## 2021-12-09 NOTE — ECT AMBULATORY DISCHARGE PLAN - NSPOSTECTCONTPROV_PSY_ALL_CORE
[facility] ([facility]) ECT Suite at [phone]
Health system ([facility]) ECT Suite at [phone]
[facility] ([facility]) ECT Suite at [phone]
[facility] ([facility]) ECT Suite at [phone]
[facility] ([facility]) ECT Suite at [phone]
[facility] ([facility]) ECT Suite at [phone]
[facility] ([facility]) ECT Suite at [phone]
[facility] ([facility]) ECT Suite at [phone]
[facility] ([facility]) ECT Suite at [phone]
[facility] ([facility]) ECT Suite at [phone]
[facility] ([facility]) ECT Suite at [phone]
[facility] ([facility]) ECT Suite at [phone]
[facility] ([facility]) ECT Suite at [phone]
[facility] ([facility]) ECT Suite at [phone]

## 2021-12-09 NOTE — ECT PRE-PROCEDURE CHECKLIST - NSPROEDALEARNPREFOTH_GEN_A_NUR
verbal instruction/written material
written material
verbal instruction/written material
written material
verbal instruction/written material
written material
verbal instruction/written material
verbal instruction/written material
written material

## 2021-12-09 NOTE — ECT PRE-PROCEDURE CHECKLIST - CAREGIVER PHONE NUMBER
[phone]
[phone]
[phone]
[phone]
[phone]
[phone]
[phone]
[phone]
[phone]
[phone]
[phone]
[phone]
[phone]
[phone]

## 2021-12-09 NOTE — ECT TREATMENT NOTE - NSECTCOMMENTS_PSY_ALL_CORE
Denies any depressive symptoms.  Agrees with plan to continue monthly maintenance ECT to prevent relapse.   No SI.  Enjoying groups and family. 
Pt is calm, pleasant and cooperative. She reports feeling well with no recurrence of depressive symptoms. Is about to start a new job, asks about D/C maintenance. We'll discuss with Dr Wei
States she has been feeling very stable and no depression over last month.  Enjoyed holidays and looking forward to new year.
  Pt is calm, pleasant and cooperative. She reports feeling well with no recurrence of depressive symptoms. Happy that one of her son got engaged recently.   We'll continue monthly maintenance ECT. 
Pt is calm, pleasant and cooperative. she reports good and stable mood through the 4 week interval. She reports functioning well and feels that her recovery has been stable.  She expressed her wish to discontinue maintenance. She was advised to consult with her psychiatric provider,  We will give appointment in 4 weeks and pt may cancel.
Feels she has been stable over last month. Has daily sadness which she attributes to coping with end of relationship.  States she is talking to therapist about this and moving forward.   Agrees with plan to continue monthly ECT.    EEG stopped spontaneously today.    Had delayed motor seizure onset, consider inc energy at next visit. 
Mood "good".  Enjoying summer. Still in [place] at [facility] till end of summer.   Denies recurrence of symptoms of depression.   Continue monthly maintenance ECT. 
States she is doing well.  Denies problems since last visit.  Enjoyed wedding.  Sleep and appetite good.   Continue q4 week ECT. 
States she is doing well.  Active over last month, walking and "doing all parts of my psychiatric program".  Denies any symptoms of depression. Stable on monthly ECT. 
Patient reports she has done well over last month.  Denies feeling depressed. Energy, sleep, appetite good.  Appears stable on monthly ECT.
Pt was considering stopping ECT last time as she was in remission, but in the meantime she stopped taking Zyprexa as per outpatient psychiatrist recommendation, and discussed with outpatient psychiatrist that they would like to see 2 more stable month Zyprexa free, before stopping ECT course. Accordingly our plan is to provide two more monthly ECT treatments and if pt is stable we will d/c ECT.
Continues to feel "really pretty good". Denies feeling depressed. Cooperative, NAD, friendly.  Enjoying [place] groups.  Going to [place] for wedding in next few weeks. 
Pt states she is doing well. 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  Pt states she still wishes to continue with plan to return in 4 weeks and if stable, consider stopping ECT.  States her outpt team is aware and agree.
Continues to feel good overall. Enjoying PROS and groups.  States mood is ok.  No SI.  Reviewed chart with patient who has been doing well on monthly ECT since end of last summer.  Patient agrees with plan to continue monthly ECT through end of summer and then visit idea with outpt [place] if she should continue longer vs considering trial without ECT.

## 2021-12-09 NOTE — ECT PRE-PROCEDURE CHECKLIST - INV PERIPH IV SIZE
22 gauge
22 gauge
22 gauge/1.0 in length
22 gauge
22 gauge/1.0 in length
22 gauge

## 2021-12-09 NOTE — ECT AMBULATORY DISCHARGE PLAN - NS TRANSFER DISPOSITION PATIENT BELONGINGS
not applicable

## 2021-12-09 NOTE — ECT PRE-PROCEDURE CHECKLIST - PATIENT REPRESENTATIVE: ( YOU CAN CHOOSE ANY PERSON THAT CAN ASSIST YOU WITH YOUR HEALTH CARE PREFERENCES, DOES NOT HAVE TO BE A SPOUSE, IMMEDIATE FAMILY OR SIGNIFICANT OTHER/PARTNER)
same name as above

## 2021-12-09 NOTE — ECT AMBULATORY DISCHARGE PLAN - NSPOSTECTWORSEPSYCHSX_PSY_ALL_CORE
If you are experiencing heightened or worsening psychological symptoms please contact your private psychiatrist.

## 2021-12-09 NOTE — ECT PRE-PROCEDURE CHECKLIST - NSPROPTRIGHTSUPPORTPHONE_GEN_A_NUR
[phone]
[phone]
[phone]
[phone]
[phone]
[phone]
[phone]
[phone]
[phone]
[phone]
[phone]
[phone]
[phone]
[phone]

## 2021-12-09 NOTE — ECT AMBULATORY DISCHARGE PLAN - NSPOSTECTRESTRIC_PSY_ALL_CORE
Drive a car, operate power tools or machinery./Drink alcohol, beer, or wine./Make important personal and business decisions./If you have had any type of sedation, you may experience lightheadedness, dizziness, or sleepiness following your procedure.  A responsible adult should stay with you for at least 24 hours following your procedure.
If you have had any type of sedation, you may experience lightheadedness, dizziness, or sleepiness following your procedure.  A responsible adult should stay with you for at least 24 hours following your procedure./Drive a car, operate power tools or machinery./Drink alcohol, beer, or wine./Make important personal and business decisions.
Drive a car, operate power tools or machinery./Drink alcohol, beer, or wine./Make important personal and business decisions./If you have had any type of sedation, you may experience lightheadedness, dizziness, or sleepiness following your procedure.  A responsible adult should stay with you for at least 24 hours following your procedure.

## 2021-12-09 NOTE — ECT PRE-PROCEDURE CHECKLIST - PHONE #
[phone]
[phone]
[phone]
[phone]
[phone]
[phone]
[phone]
[phone]
[phone]
[phone]
[phone]
[phone]
[phone]

## 2021-12-09 NOTE — ECT AMBULATORY DISCHARGE PLAN - NSPOSTECTCALLZHH_PSY_ALL_CORE
Call the [facility] ECT suite at [phone]
Call the [facility] ECT suite at [phone]
Call the [facility] ECT suite at [phone]
Call the [facility] ECT suite at [phone]
Call the [facility] ECT suite at [phone]
Call the [facility] ECT suite at [phone]
Call the [facility] ECT suite at [phone]
Call the [facility] ECT suite at [phone]
Call the [facility] ECT suite at [phone]
Call the [facility] ECT suite at [phone]
Call the [facility] ECT suite at [phone]
Call the [facility] ECT suite at [phone]
Call the [facility] ECT suite at [phone]
Call the [facility] ECT suite at [phone]

## 2021-12-09 NOTE — ECT AMBULATORY DISCHARGE PLAN - NSECTPROCEDUREDATE_PSY_ALL_CORE
09-Dec-2021
17-Sep-2021
27-May-2021
29-Apr-2021
24-Jun-2021
19-Aug-2021
14-Oct-2021
07-Jan-2021
01-Apr-2021
04-Feb-2021
04-Mar-2021
11-Nov-2021
22-Jul-2021

## 2021-12-09 NOTE — ECT PRE-PROCEDURE CHECKLIST - PATIENT PROBLEMS/NEEDS
Patient expressed no known problems or needs

## 2021-12-09 NOTE — ECT PRE-PROCEDURE CHECKLIST - NSBHSUPPORTCOMMENT_PSY_ALL_CORE
HIPPA
Maintenance bifrontal 8/6/20 to 8/6/21  Anesthesia consent 10/16/20 to 1/16/21
HIPPA
ECT BF Maintenance Consent from 8/6/20  Anesthesia Consent Expires 1/16/21
HIPPA
ECT BF Maintenance Consent from 8/6/20  Anesthesia Consent obtained on 2/4/21
HIPPA

## 2021-12-09 NOTE — ECT TREATMENT NOTE - NSECTRECTXSCHED_PSY_ALL_CORE
Monthly

## 2021-12-09 NOTE — ECT TREATMENT NOTE - NSECTIMPPLAN_PSY_ALL_CORE
Assessment today offers no contraindications to continue plan of treatment with ECT.

## 2021-12-09 NOTE — ECT TREATMENT NOTE - NSECTABDMETACOMMENT_PSY_ALL_CORE
hypothyroid
hypothryoid
hypothryoid
hypothyroid
hypothryoid
hypothyroid
hypothryoid
hypothryoid

## 2021-12-09 NOTE — ECT AMBULATORY DISCHARGE PLAN - NSPOSTECTSYMPTOMS_PSY_ALL_CORE
Excessive Diarrhea/Fever/Inability to tolerate liquids or foods/Increased irritability or sluggishness/Nausea and vomiting that does not stop/Pain not relieved by medications/Unable to urinate
Fever/Excessive Diarrhea/Inability to tolerate liquids or foods/Increased irritability or sluggishness/Nausea and vomiting that does not stop/Pain not relieved by medications/Unable to urinate
Excessive Diarrhea/Fever/Inability to tolerate liquids or foods/Increased irritability or sluggishness/Nausea and vomiting that does not stop/Pain not relieved by medications/Unable to urinate

## 2021-12-09 NOTE — ECT TREATMENT NOTE - NSECTPTEVAL_PSY_ALL_CORE
Patient evaluated and History and Physical reviewed prior to ECT. There are no significant changes to the patient's condition unless specified.

## 2021-12-10 DIAGNOSIS — F33.2 MAJOR DEPRESSIVE DISORDER, RECURRENT SEVERE WITHOUT PSYCHOTIC FEATURES: ICD-10-CM

## 2022-10-28 NOTE — ED PROVIDER NOTE - CCCP TRG CHIEF CMPLNT
Pt here for depo prov, inj.  RT D. Pt supplied injection     Lot#: [id number]  EXP: 10/2024  NDC: 81461-752-73    Pt tolerated injection   
urinary symptoms

## 2025-01-15 NOTE — ED ADULT NURSE NOTE - BREATHING, MLM
Other Day   [x] Three times per week or as needed   [] Once a week   [] Do Not Change Dressing     [] Other:      Pressure Relief:  [x] When sitting, shift position or do seat lifts every 15 minutes.  [x] Wheelchair cushion   [x] Specialty Bed/Mattress  [x] Turn every 2 hours when in bed.  Avoid position directing pressure on wound site.  Limit side lying to 30 degree tilt.  Limit HOB elevation to 30 degrees.       Off-Loading: [] Surgical shoe    [] Podus Boot(s)   [] Foam Boot(s)  [] Knee scooter [] Cast Boot [] CROW Boot  [] Other:    [] Total non-weight bearing : [] Right Leg     [] Left Leg    [x] Off-loading when : [] walking  [x] in bed [x] Sitting          Dietary:  [] Increase Protein: examples ( Meat, cheese, eggs, greek yogurt, premier protein drink, fish, nuts )   [x] Valentín  [] Protien drink supplement   [] Recommended Supplements                                               Return Appointment:    [x] Return Appointment: With Dr. Altaf Chang  in 1 Week(s)          Wound Care Center Information: Should you experience any significant changes in your wound(s) or have questions about your wound care, please contact the [facility] at MONDAY-THURSDAY 8:00 am - 4:30 AND Friday 8:00 AM- 12:00 PM .  If you need help with your wound outside these hours and cannot wait until we are again available, contact your PCP or go to the hospital emergency room.     PLEASE NOTE: IF YOU ARE UNABLE TO OBTAIN WOUND SUPPLIES, CONTINUE TO USE THE SUPPLIES YOU HAVE AVAILABLE UNTIL YOU ARE ABLE TO REACH US. IT IS MOST IMPORTANT TO KEEP THE WOUND COVERED AT ALL TIMES.     Physician Signature:_______________________ Dr. Altaf Chang       Spontaneous, unlabored and symmetrical